# Patient Record
Sex: FEMALE | Race: WHITE | NOT HISPANIC OR LATINO | Employment: STUDENT | URBAN - METROPOLITAN AREA
[De-identification: names, ages, dates, MRNs, and addresses within clinical notes are randomized per-mention and may not be internally consistent; named-entity substitution may affect disease eponyms.]

---
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
## 2020-01-03 NOTE — PROGRESS NOTES
CC:  Vitamin B12 deficiency    HPI:  Ms Enriquez is a 20 yo woman who presents today for vit B12 deficiency. She presented on 12/9/19 with bilateral lower extremity weakness, ascending paresthesias over 7-10 days. LP showed slightly elevated protein otherwise normal. MRI brain unremarkable. she was found to have vitamin B12 deficiency with B12 level of 157. She received vit B12 injection daily x 5 days with improvement in her symptoms. Folate normal 13.6. Hb was normal at 13.6. Intrinsic factor antibody preliminary result is negative. Anti-parietal antibody is negative. She presents today for further management. Numbness has improved but still has numbness from feet to knees. Denies other complaints. She has always been eating a normal diet with vegetables and meat. She lives in [place] but comes here for school. Has gotten 3 weekly vit B12 with her PCP in [place]. Last dose on 1/2.       Past Medical History:   Diagnosis Date    Depression         Past Surgical History:   Procedure Laterality Date    TONSILLECTOMY         No family history on file.    Social History     Socioeconomic History    Marital status: Single     Spouse name: Not on file    Number of children: Not on file    Years of education: Not on file    Highest education level: Not on file   Occupational History    Not on file   Social Needs    Financial resource strain: Not on file    Food insecurity:     Worry: Not on file     Inability: Not on file    Transportation needs:     Medical: Not on file     Non-medical: Not on file   Tobacco Use    Smoking status: Never Smoker    Smokeless tobacco: Never Used   Substance and Sexual Activity    Alcohol use: Yes     Comment: occ    Drug use: Not Currently    Sexual activity: Not on file   Lifestyle    Physical activity:     Days per week: Not on file     Minutes per session: Not on file    Stress: Not on file   Relationships    Social connections:     Talks on phone: Not on file     Gets  together: Not on file     Attends [employer] service: Not on file     Active member of club or organization: Not on file     Attends meetings of clubs or organizations: Not on file     Relationship status: Not on file   Other Topics Concern    Not on file   Social History Narrative    Not on file       Review of Systems   Constitutional: Negative for appetite change, chills, fatigue, fever and unexpected weight change.   HENT: Negative for mouth sores, nosebleeds, tinnitus, trouble swallowing and voice change.    Eyes: Negative for pain, redness and visual disturbance.   Respiratory: Negative for cough, shortness of breath and wheezing.    Cardiovascular: Negative for chest pain, palpitations and leg swelling.   Gastrointestinal: Negative for abdominal distention, abdominal pain, blood in stool, constipation, diarrhea, nausea and vomiting.   Endocrine: Negative for polydipsia, polyphagia and polyuria.   Genitourinary: Negative for flank pain, frequency and hematuria.   Musculoskeletal: Negative for arthralgias, back pain, gait problem, joint swelling, myalgias, neck pain and neck stiffness.   Skin: Negative for color change, pallor, rash and wound.   Neurological: Positive for numbness. Negative for tremors, seizures, syncope, speech difficulty, weakness, light-headedness and headaches.   Hematological: Negative for adenopathy. Does not bruise/bleed easily.   Psychiatric/Behavioral: Negative for confusion, dysphoric mood and self-injury. The patient is not nervous/anxious.    All other systems reviewed and are negative.      Objective:  Physical Exam   Constitutional: She is oriented to person, place, and time. She appears well-developed and well-nourished. No distress.   HENT:   Head: Normocephalic and atraumatic.   Mouth/Throat: Oropharynx is clear and moist. No oropharyngeal exudate.   Eyes: Pupils are equal, round, and reactive to light. Conjunctivae and EOM are normal. No scleral icterus.   Neck: Normal range  of motion. Neck supple. No JVD present. No thyromegaly present.   Cardiovascular: Normal rate, regular rhythm, normal heart sounds and intact distal pulses. Exam reveals no gallop and no friction rub.   No murmur heard.  Pulmonary/Chest: Effort normal and breath sounds normal. No respiratory distress. She has no wheezes. She has no rales.   Abdominal: Soft. Bowel sounds are normal. She exhibits no distension and no mass. There is no hepatosplenomegaly. There is no tenderness. There is no rebound. No hernia.   Musculoskeletal: Normal range of motion. She exhibits no edema, tenderness or deformity.   Lymphadenopathy:     She has no cervical adenopathy.        Right: No supraclavicular adenopathy present.        Left: No supraclavicular adenopathy present.   Neurological: She is alert and oriented to person, place, and time. No cranial nerve deficit.   Skin: Skin is warm and dry. No rash noted. She is not diaphoretic. No erythema. No pallor.   Psychiatric: She has a normal mood and affect. Her behavior is normal. Judgment and thought content normal.   Vitals reviewed.      Labs:  12/20/2019: vitamin B12 level of 157.    Imaging Data:  Folate normal 13.6. Hb was normal at 13.6. Intrinsic factor antibody preliminary result is negative. Anti-parietal antibody is negative.     Assessment and plan:    1. B12 deficiency    2. Paresthesia       1.  - Ms Enriquez is a 18 yo woman with vitamin B12 deficiency.   - eats a normal diet but still with low vit B12 deficiency causing paresthesia. Refer to GI for further workup. Consider EGD  - check B12 level today  - Has gotten 3 weekly vit B12 with her PCP in [place]. Last dose on 1/2.   - give last weekly injection of B12 once approved. Then B12 monthly  - RTC in 2 months for follow up    2.   - improved after B12 injection started  - f/u with neurology

## 2020-01-07 ENCOUNTER — LAB VISIT (OUTPATIENT)
Dept: LAB | Facility: OTHER | Age: 20
End: 2020-01-07
Attending: INTERNAL MEDICINE
Payer: COMMERCIAL

## 2020-01-07 ENCOUNTER — INITIAL CONSULT (OUTPATIENT)
Dept: HEMATOLOGY/ONCOLOGY | Facility: CLINIC | Age: 20
End: 2020-01-07
Payer: COMMERCIAL

## 2020-01-07 VITALS
SYSTOLIC BLOOD PRESSURE: 109 MMHG | DIASTOLIC BLOOD PRESSURE: 62 MMHG | HEIGHT: 64 IN | HEART RATE: 99 BPM | TEMPERATURE: 97 F | OXYGEN SATURATION: 97 % | RESPIRATION RATE: 16 BRPM | BODY MASS INDEX: 28.6 KG/M2 | WEIGHT: 167.56 LBS

## 2020-01-07 DIAGNOSIS — E53.8 B12 DEFICIENCY: Primary | ICD-10-CM

## 2020-01-07 DIAGNOSIS — E53.8 B12 DEFICIENCY: ICD-10-CM

## 2020-01-07 DIAGNOSIS — R20.2 PARESTHESIA: ICD-10-CM

## 2020-01-07 PROCEDURE — 82607 VITAMIN B-12: CPT

## 2020-01-07 PROCEDURE — 3008F BODY MASS INDEX DOCD: CPT | Mod: CPTII,S$GLB,, | Performed by: INTERNAL MEDICINE

## 2020-01-07 PROCEDURE — 36415 COLL VENOUS BLD VENIPUNCTURE: CPT

## 2020-01-07 PROCEDURE — 99999 PR PBB SHADOW E&M-EST. PATIENT-LVL IV: ICD-10-PCS | Mod: PBBFAC,,, | Performed by: INTERNAL MEDICINE

## 2020-01-07 PROCEDURE — 99999 PR PBB SHADOW E&M-EST. PATIENT-LVL IV: CPT | Mod: PBBFAC,,, | Performed by: INTERNAL MEDICINE

## 2020-01-07 PROCEDURE — 99214 PR OFFICE/OUTPT VISIT, EST, LEVL IV, 30-39 MIN: ICD-10-PCS | Mod: S$GLB,,, | Performed by: INTERNAL MEDICINE

## 2020-01-07 PROCEDURE — 3008F PR BODY MASS INDEX (BMI) DOCUMENTED: ICD-10-PCS | Mod: CPTII,S$GLB,, | Performed by: INTERNAL MEDICINE

## 2020-01-07 PROCEDURE — 99214 OFFICE O/P EST MOD 30 MIN: CPT | Mod: S$GLB,,, | Performed by: INTERNAL MEDICINE

## 2020-01-07 RX ORDER — CYANOCOBALAMIN 1000 UG/ML
1000 INJECTION, SOLUTION INTRAMUSCULAR; SUBCUTANEOUS
Status: CANCELLED | OUTPATIENT
Start: 2020-01-07

## 2020-01-07 NOTE — LETTER
January 7, 2020      Viri Gamble PA-C  [address]  [facility] [address]           [facility] [address]  [address], [address]  [facility] [address]  Phone: [phone]          Patient: Denise Enriquez   MR Number: [id number]   YOB: 2000   Date of Visit: 1/7/2020       Dear Viri Gamble:    Thank you for referring Denise Enriquez to me for evaluation. Attached you will find relevant portions of my assessment and plan of care.    If you have questions, please do not hesitate to call me. I look forward to following Denise Enriquez along with you.    Sincerely,    Florentino Hernandez MD    Enclosure  CC:  No Recipients    If you would like to receive this communication electronically, please contact [email or web address] or [phone] to request more information on [employer] Link access.    For providers and/or their staff who would like to refer a patient to [facility], please contact us through our one-stop-shop provider referral line, [facility] , at [phone].    If you feel you have received this communication in error or would no longer like to receive these types of communications, please e-mail [email or web address]

## 2020-01-07 NOTE — Clinical Note
Check vit B12 today. Refer to GI. Verify with PA re vitamin B12. Get vitamin B12 weekly x one dose (only has one left), followed by monthly (start one week after the weekly dose). RTC on 3/19 with CBC, vit B12

## 2020-01-08 LAB — VIT B12 SERPL-MCNC: 755 PG/ML (ref 210–950)

## 2020-01-22 LAB
ANNOTATION COMMENT IMP: NORMAL
IF BLOCK AB SER QL: NEGATIVE

## 2020-01-23 ENCOUNTER — OFFICE VISIT (OUTPATIENT)
Dept: NEUROLOGY | Facility: CLINIC | Age: 20
End: 2020-01-23
Payer: COMMERCIAL

## 2020-01-23 VITALS
DIASTOLIC BLOOD PRESSURE: 73 MMHG | BODY MASS INDEX: 29.67 KG/M2 | WEIGHT: 173.81 LBS | HEIGHT: 64 IN | SYSTOLIC BLOOD PRESSURE: 124 MMHG | HEART RATE: 83 BPM

## 2020-01-23 DIAGNOSIS — R93.7 ABNORMAL MRI, THORACIC SPINE: Primary | ICD-10-CM

## 2020-01-23 DIAGNOSIS — R93.7 ABNORMAL MRI, CERVICAL SPINE: ICD-10-CM

## 2020-01-23 DIAGNOSIS — E55.9 VITAMIN D INSUFFICIENCY: ICD-10-CM

## 2020-01-23 DIAGNOSIS — R20.2 PARESTHESIA: ICD-10-CM

## 2020-01-23 DIAGNOSIS — E53.8 VITAMIN B12 DEFICIENCY: ICD-10-CM

## 2020-01-23 DIAGNOSIS — F19.90 DRUG USE: ICD-10-CM

## 2020-01-23 PROCEDURE — 99215 PR OFFICE/OUTPT VISIT, EST, LEVL V, 40-54 MIN: ICD-10-PCS | Mod: S$GLB,,, | Performed by: PSYCHIATRY & NEUROLOGY

## 2020-01-23 PROCEDURE — 99999 PR PBB SHADOW E&M-EST. PATIENT-LVL III: CPT | Mod: PBBFAC,,, | Performed by: PSYCHIATRY & NEUROLOGY

## 2020-01-23 PROCEDURE — 99999 PR PBB SHADOW E&M-EST. PATIENT-LVL III: ICD-10-PCS | Mod: PBBFAC,,, | Performed by: PSYCHIATRY & NEUROLOGY

## 2020-01-23 PROCEDURE — 99215 OFFICE O/P EST HI 40 MIN: CPT | Mod: S$GLB,,, | Performed by: PSYCHIATRY & NEUROLOGY

## 2020-01-23 PROCEDURE — 3008F BODY MASS INDEX DOCD: CPT | Mod: CPTII,S$GLB,, | Performed by: PSYCHIATRY & NEUROLOGY

## 2020-01-23 PROCEDURE — 3008F PR BODY MASS INDEX (BMI) DOCUMENTED: ICD-10-PCS | Mod: CPTII,S$GLB,, | Performed by: PSYCHIATRY & NEUROLOGY

## 2020-01-23 RX ORDER — DULOXETIN HYDROCHLORIDE 30 MG/1
30 CAPSULE, DELAYED RELEASE ORAL DAILY
Qty: 30 CAPSULE | Refills: 11 | Status: SHIPPED | OUTPATIENT
Start: 2020-01-23 | End: 2020-09-22

## 2020-01-23 RX ORDER — MIRTAZAPINE 7.5 MG/1
7.5 TABLET, FILM COATED ORAL NIGHTLY
COMMUNITY
End: 2020-03-05

## 2020-01-23 NOTE — LETTER
January 30, 2020      Carlota Gonzalez MD  [address]  [address]  [facility] [address]           [facility]  [address]  [facility] [address]  Phone: [phone]          Patient: Denise Enriquez   MR Number: [id number]   YOB: 2000   Date of Visit: 1/23/2020       Dear Dr. Carlota Gonzalez:    Thank you for referring Denise Enriquez to me for evaluation. Attached you will find relevant portions of my assessment and plan of care.    If you have questions, please do not hesitate to call me. I look forward to following Denise Enriquez along with you.    Sincerely,    Negro Mosquera  CC:  No Recipients    If you would like to receive this communication electronically, please contact [email or web address] or [phone] to request more information on [employer] Link access.    For providers and/or their staff who would like to refer a patient to [facility], please contact us through our one-stop-shop provider referral line, [facility], at [phone].    If you feel you have received this communication in error or would no longer like to receive these types of communications, please e-mail [email or web address]

## 2020-01-27 ENCOUNTER — PATIENT MESSAGE (OUTPATIENT)
Dept: NEUROLOGY | Facility: CLINIC | Age: 20
End: 2020-01-27

## 2020-01-27 NOTE — PROGRESS NOTES
Neurology Clinic Visit  Primary Care Provider: Karina Rasmussen MD   Referring Provider: Carlota Gonzalez MD   Date of Visit: 01/23/2020  Reason for referral: Transverse Myelitis     chief complaint:   Chief Complaint   Patient presents with    Neurologic Problem       History of Present Illness  Denise Enriquez is a 19 y.o. female who presents for evaluation for transverse myelitis.  The patient reports on December 2, 2019, she woke up with tingling and numbness in both of her legs.  There was no pain associated with this.  She did not experience any difficulty walking, bowel incontinence, or urinary incontinence.  The symptoms persisted and on December 9th, she noticed that the sensation went up to her belly button and she also felt that her left hand primarily the palm and finger tips were numb.  She presented to [facility] and was admitted for further evaluation.  She was examined by the Neurology service who found a T9 sensory level to pinprick and temperature and MRI of the cervical, thoracic, and lumbar spine revealed multiple lesions within the thoracic spine.  She was treated for transverse myelitis with a 5 day course of IV Solu-Medrol.  She never developed weakness. The patient reports that her tingling and numbness of her lower extremities has improved except for some residual tingling in her feet.  She has noticed whenever she bends her head towards her chest, she gets a shock-wave sensation down her back. She also describes this as her nerves vibrating.   The paresthesias in the left hand have resolved.  She denies any previous attacks as such.  She denies episodes of vision loss or eye pain in the past. She denies having any recent vaccinations prior to her admission to the hospital.  She was treated for bacterial vaginosis about 1 week prior to admission.  She denies any history of autoimmune disorders.  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 While in hospital, workup revealed vitamin B12 deficiency for which she is on replacement. She has a history of depression for which she was taking Mirtazapine. She is planning to stop Mirtazapine which she states was ok from her psychiatrist.  She is currently on B12 and vitamin D supplementation and feels that depression has improved since starting vitamin supplementation.     Serum Workup:  B12 158  Mag Antibody Negative 12/11/2019  MMA >5.00 elevated, 12/11/2019  Lyme Negative, 12/10/2019  SS-A/SS-B negative  Serum NMO negative  RPR nonreactive  KRYSTIAN negative  VDRL Negative  Intrinsic Factor Blocking Antibody negative, 12/11/2019  Antiparietal antibody negative, 12/11/2019  Ceruloplasmin normal  Vitamin D: 22  HTLV I/II Antibody negative  HIV negative    CSF Analysis:   CSF: protein 49, glucose 55 Oligoclonal bands 0, IgG index normal; WBC 5; RBC 3, 12/9/2020   EBV DNA Qual by PCR CSF not detected, 12/10/2019  VDRL Nonreactive, [place] IgG/IgM negative, 12/9-10/2020     Imaging: (personally reviewed)    MRI Cervical, Thoracic, Lumbar Spine w/wo contrast: 12/10/2020  Left paracentral disc protrusion at the C5-C6 level with mild narrowing of the spinal canal.  Grade 1 anterolisthesis of L5 on S1 suspected bilateral pars defects at this level.  Dedicated noncontrast CT scan of the lumbar spine may be obtained for further evaluation.  Spine surgery evaluation is suggested. Uncovering of the disc at the L5-S1 level without significant narrowing of the spinal canal.  Large central disc extrusion at the L4-L5 level with associated mild narrowing of the spinal canal.  Radiologist Addendum:There are multiple scattered intramedullary lesions throughout the thoracic cord.  There is no evidence of cord expansion.    MRI Cervical Spine w/wo: 12/11/2019  There is indentation of the ventral thecal sac at the C5-C6 level secondary to left paracentral disc protrusion.  The findings are unchanged compared to the prior  examination.  No cord signal abnormality is identified per radiology read.  On my review, there is questionable  increase T2 signal of cord at C2 level on right.    MRI Brain w/wo contrast: 12/10/2019  Unremarkable contrast enhanced MRI of the brain    Outside reports reviewed: historical medical records.    Patient Active Problem List    Diagnosis Date Noted    Paresthesia     Myelopathy, subacute with ascending paresthesias 12/10/2019    B12 deficiency 12/10/2019     Past Medical History:   Diagnosis Date    Depression      Past Surgical History:   Procedure Laterality Date    TONSILLECTOMY       Family history:  She denies family history of Multiple Sclerosis or autoimmune disorders.       Current Outpatient Medications   Medication Sig    mirtazapine (REMERON) 7.5 MG Tab Take 7.5 mg by mouth every evening.    cyanocobalamin 1,000 mcg/mL injection Inject 1 mL (1,000 mcg total) into the muscle once daily. (Patient taking differently: Inject 1,000 mcg into the muscle every 30 days. )    DULoxetine (CYMBALTA) 30 MG capsule Take 1 capsule (30 mg total) by mouth once daily.    ergocalciferol (ERGOCALCIFEROL) 50,000 unit Cap Take 1 capsule (50,000 Units total) by mouth every 7 days.     No current facility-administered medications for this visit.        Review of patient's allergies indicates:  No Known Allergies  Social History     Socioeconomic History    Marital status: Single     Spouse name: Not on file    Number of children: Not on file    Years of education: Not on file    Highest education level: Not on file   Occupational History    Not on file   Social Needs    Financial resource strain: Not on file    Food insecurity:     Worry: Not on file     Inability: Not on file    Transportation needs:     Medical: Not on file     Non-medical: Not on file   Tobacco Use    Smoking status: Never Smoker    Smokeless tobacco: Never Used   Substance and Sexual Activity    Alcohol use: Yes     Comment:  "occ    Drug use: Not Currently    Sexual activity: Not on file   Lifestyle    Physical activity:     Days per week: Not on file     Minutes per session: Not on file    Stress: Not on file   Relationships    Social connections:     Talks on phone: Not on file     Gets together: Not on file     Attends Bahai service: Not on file     Active member of club or organization: Not on file     Attends meetings of clubs or organizations: Not on file     Relationship status: Not on file   Other Topics Concern    Not on file   Social History Narrative    Not on file       Review of Systems    Constitutional: + weight gain  Eyes: negative  Ears, nose, mouth, throat, and face: negative  Respiratory: negative  Cardiovascular: negative  Gastrointestinal: negative  Genitourinary:negative  Integument/breast: negative  Hematologic/lymphatic: negative  Musculoskeletal:negative  Neurological: positive for paresthesia in legs  Behavioral/Psych: negative  Endocrine: negative  Allergic/Immunologic: negative    Objective:  Vital signs in last 24 hours:    Vitals:    01/23/20 1441   BP: 124/73   Pulse: 83   Weight: 78.8 kg (173 lb 13.3 oz)   Height: 5' 4" (1.626 m)       Body mass index is 29.84 kg/m².     25 Ft walk test: 3.53seconds without assitance.    In general, the patient is well nourished.    MENTAL STATUS: language is fluent, normal verbal comprehension, short-term and remote memory is intact, attention is normal, patient is alert and oriented x 3, fund of knowlege is appropriate by vocabulary.     CRANIAL NERVE EXAM:  Visual acuity 20/20 OD and OS. There is no AMARILIS.  Extraocular muscles are intact. Pupils are equal, round, and reactive to light. No facial asymmetry. Facial sensation is intact bilaterally. There is no dysarthria. Uvula is midline, and palate moves symmetrically. Shoulder shrug intact bilaterlly. Tongue protrusion is midline. Hearing is grossly intact. Neck is supple.     MOTOR EXAM: Normal bulk and tone " throughout UE and LE bilaterally.   No pronator drift; rapid sequential movements are normal; Strength is  5/5 in all groups in the lower extremities and upper extremities;    REFLEXES: 3+ and symmetric throughout in all four extremeties; toes are down bilaterally    SENSORY EXAM: Normal to light touch, pinprick throughout. Decrease vibration sense upto knee in Right Lower extremity.    COORDINATION: Normal finger-to-nose exam     GAIT: Narrow based and stable    Imaging  As above    Assessment/Plan:    This is a 19-year-old female own who presented to an outside hospital for paresthesias in her lower extremities which later extended to abdomen and left hand.  She was admitted and treated for transverse myelitis for which she has been referred here for further evaluation.  Upon workup at outside hospital, MRI of the thoracic spine revealed multiple levels of increase T2 signal concerning for transverse myelitis.  There was questionable increased signal noted on MRI of the cervical spine although not officially documented per Radiology.  She was treated with IV Solu-Medrol for 5 days and her symptoms have mostly resolved except for residual paresthesias in her feet.  CSF analysis revealed no oligoclonal bands, normal IgG index, normal WBC count.  She was also found to be B12 deficient for which she is on replacement therapy.  She does report history of drug use including Whippets which she stopped since discharge.  Her transverse myelitis could be secondary to drug use (Nitric oxide/whippet induced) vs idiopathic vs demyelinating disease such as MS. Given new symptoms (Lhermitte's sign) that has developed after discharge, we will repeat imaging for further evaluation.     1. Abnormal MRI Thoracic Spine  2. Abnormal MRI Cervical Spine  3. Paresthesias related to #1 and #2  4. B12 deficiency  5. Vitamin D Insufficiency    Plan:  Obtain MRI Brain, MRI Cervical Spine, MRI Thoracic Spine.  Patient was educated on drug  use  Stop Mirtazapine and start Cymbalta  Continue Vitamin B12 and Vitamin D supplementation    Follow-up in 6 weeks.      Problem List Items Addressed This Visit        Other    Paresthesia - Primary    Relevant Medications    DULoxetine (CYMBALTA) 30 MG capsule    Other Relevant Orders    MRI Brain Demyelinating W W/O Contrast    MRI Cervical Spine Demyelinating W W/O Contrast    MRI Thoracic Spine Demyelinating W W/O Contrast      Other Visit Diagnoses     Abnormal MRI, thoracic spine        Relevant Orders    MRI Brain Demyelinating W W/O Contrast    MRI Cervical Spine Demyelinating W W/O Contrast    MRI Thoracic Spine Demyelinating W W/O Contrast    Abnormal MRI, cervical spine        Relevant Orders    MRI Brain Demyelinating W W/O Contrast    MRI Cervical Spine Demyelinating W W/O Contrast    MRI Thoracic Spine Demyelinating W W/O Contrast    Vitamin B12 deficiency        Vitamin D insufficiency              Orlando Meyer MD  Neuroimmunology/MS Fellow  [facility]

## 2020-01-28 ENCOUNTER — PATIENT MESSAGE (OUTPATIENT)
Dept: INFUSION THERAPY | Facility: OTHER | Age: 20
End: 2020-01-28

## 2020-01-28 ENCOUNTER — PATIENT MESSAGE (OUTPATIENT)
Dept: NEUROLOGY | Facility: CLINIC | Age: 20
End: 2020-01-28

## 2020-01-29 ENCOUNTER — TELEPHONE (OUTPATIENT)
Dept: NEUROLOGY | Facility: CLINIC | Age: 20
End: 2020-01-29

## 2020-01-29 LAB
ALBUMIN CSF-MCNC: 29.1 MG/DL
ALBUMIN SERPL-MCNC: 4400 MG/DL (ref 3200–4800)
EV RNA SPEC QL NAA+PROBE: NEGATIVE
HSV1, PCR, CSF: NEGATIVE
HSV2, PCR, CSF: NEGATIVE
IGG CSF-MCNC: 3 MG/DL
IGG SERPL-MCNC: 844 MG/DL (ref 767–1590)
IGG SYNTH RATE SER+CSF CALC-MRATE: 1.36 MG/24 H
IGG/ALB CLEAR SER+CSF-RTO: 0.53
IGG/ALB CSF: 0.1 {RATIO}
IGG/ALB SER: 0.19 {RATIO}
OLIGOCLONAL BANDS CSF ELPH-IMP: 0 BANDS
OLIGOCLONAL BANDS CSF ELPH-IMP: 0 BANDS
OLIGOCLONAL BANDS SERPL: 0 BANDS
SPECIMEN SOURCE: NORMAL
SPECIMEN SOURCE: NORMAL
VARICELLA ZOSTER BY PCR RESULT: NEGATIVE
WEST NILE VIRUS CSF INTERP: NORMAL
WNV IGG CSF QL: NEGATIVE
WNV IGM CSF QL IA: NEGATIVE

## 2020-01-30 ENCOUNTER — HOSPITAL ENCOUNTER (OUTPATIENT)
Dept: RADIOLOGY | Facility: HOSPITAL | Age: 20
Discharge: HOME | End: 2020-01-30
Attending: PSYCHIATRY & NEUROLOGY
Payer: COMMERCIAL

## 2020-01-30 DIAGNOSIS — R93.7 ABNORMAL MRI, THORACIC SPINE: ICD-10-CM

## 2020-01-30 DIAGNOSIS — R93.7 ABNORMAL MRI, CERVICAL SPINE: ICD-10-CM

## 2020-01-30 DIAGNOSIS — R20.2 PARESTHESIA: ICD-10-CM

## 2020-01-30 PROCEDURE — 72156 MRI NECK SPINE W/O & W/DYE: CPT | Mod: 26,,, | Performed by: RADIOLOGY

## 2020-01-30 PROCEDURE — A9585 GADOBUTROL INJECTION: HCPCS | Performed by: PSYCHIATRY & NEUROLOGY

## 2020-01-30 PROCEDURE — 72157 MRI CHEST SPINE W/O & W/DYE: CPT | Mod: 26,,, | Performed by: RADIOLOGY

## 2020-01-30 PROCEDURE — 72157 MRI THORACIC SPINE DEMYELINATING W W/O CONTRAST: ICD-10-PCS | Mod: 26,,, | Performed by: RADIOLOGY

## 2020-01-30 PROCEDURE — 25500020 PHARM REV CODE 255: Performed by: PSYCHIATRY & NEUROLOGY

## 2020-01-30 PROCEDURE — 70553 MRI BRAIN STEM W/O & W/DYE: CPT | Mod: 26,,, | Performed by: RADIOLOGY

## 2020-01-30 PROCEDURE — 72156 MRI NECK SPINE W/O & W/DYE: CPT | Mod: TC

## 2020-01-30 PROCEDURE — 70553 MRI BRAIN STEM W/O & W/DYE: CPT | Mod: TC

## 2020-01-30 PROCEDURE — 70553 MRI BRAIN DEMYELINATING W/ WO CONTRAST: ICD-10-PCS | Mod: 26,,, | Performed by: RADIOLOGY

## 2020-01-30 PROCEDURE — 72157 MRI CHEST SPINE W/O & W/DYE: CPT | Mod: TC

## 2020-01-30 PROCEDURE — 72156 MRI CERVICAL SPINE DEMYELINATING W W/O CONTRAST: ICD-10-PCS | Mod: 26,,, | Performed by: RADIOLOGY

## 2020-01-30 RX ORDER — GADOBUTROL 604.72 MG/ML
9 INJECTION INTRAVENOUS
Status: COMPLETED | OUTPATIENT
Start: 2020-01-30 | End: 2020-01-30

## 2020-01-30 RX ORDER — ALPRAZOLAM 1 MG/1
1 TABLET ORAL ONCE
Qty: 1 TABLET | Refills: 0 | Status: SHIPPED | OUTPATIENT
Start: 2020-01-30 | End: 2020-03-05

## 2020-01-30 RX ADMIN — GADOBUTROL 9 ML: 604.72 INJECTION INTRAVENOUS at 04:01

## 2020-02-06 ENCOUNTER — PATIENT MESSAGE (OUTPATIENT)
Dept: INFUSION THERAPY | Facility: OTHER | Age: 20
End: 2020-02-06

## 2020-03-05 ENCOUNTER — OFFICE VISIT (OUTPATIENT)
Dept: NEUROLOGY | Facility: CLINIC | Age: 20
End: 2020-03-05
Payer: COMMERCIAL

## 2020-03-05 VITALS
BODY MASS INDEX: 29.35 KG/M2 | DIASTOLIC BLOOD PRESSURE: 58 MMHG | SYSTOLIC BLOOD PRESSURE: 102 MMHG | HEART RATE: 66 BPM | HEIGHT: 64 IN | WEIGHT: 171.94 LBS

## 2020-03-05 DIAGNOSIS — E53.8 LOW SERUM VITAMIN B12: ICD-10-CM

## 2020-03-05 DIAGNOSIS — G37.3 TRANSVERSE MYELOPATHY: Primary | ICD-10-CM

## 2020-03-05 PROCEDURE — 3008F PR BODY MASS INDEX (BMI) DOCUMENTED: ICD-10-PCS | Mod: CPTII,S$GLB,, | Performed by: PSYCHIATRY & NEUROLOGY

## 2020-03-05 PROCEDURE — 3008F BODY MASS INDEX DOCD: CPT | Mod: CPTII,S$GLB,, | Performed by: PSYCHIATRY & NEUROLOGY

## 2020-03-05 PROCEDURE — 99215 OFFICE O/P EST HI 40 MIN: CPT | Mod: S$GLB,,, | Performed by: PSYCHIATRY & NEUROLOGY

## 2020-03-05 PROCEDURE — 99999 PR PBB SHADOW E&M-EST. PATIENT-LVL III: ICD-10-PCS | Mod: PBBFAC,,, | Performed by: PSYCHIATRY & NEUROLOGY

## 2020-03-05 PROCEDURE — 99999 PR PBB SHADOW E&M-EST. PATIENT-LVL III: CPT | Mod: PBBFAC,,, | Performed by: PSYCHIATRY & NEUROLOGY

## 2020-03-05 PROCEDURE — 99215 PR OFFICE/OUTPT VISIT, EST, LEVL V, 40-54 MIN: ICD-10-PCS | Mod: S$GLB,,, | Performed by: PSYCHIATRY & NEUROLOGY

## 2020-03-05 NOTE — LETTER
April 1, 2020      Viri Gamble PA-C  [address]  [facility] [address]           [facility]  [address]  [facility] [address]  Phone: [phone]          Patient: Denise Enriquez   MR Number: [id number]   YOB: 2000   Date of Visit: 3/5/2020       Dear Viri Gamble:    Thank you for referring Denise Enriquez to me for evaluation. Attached you will find relevant portions of my assessment and plan of care.    If you have questions, please do not hesitate to call me. I look forward to following Denise Enriquez along with you.    Sincerely,    Orlando Meyer MD    Enclosure  CC:  No Recipients    If you would like to receive this communication electronically, please contact [email or web address] or [phone] to request more information on [employer] Link access.    For providers and/or their staff who would like to refer a patient to [facility], please contact us through our one-stop-shop provider referral line, [facility] , at [phone].    If you feel you have received this communication in error or would no longer like to receive these types of communications, please e-mail [email or web address]

## 2020-03-05 NOTE — PROGRESS NOTES
Subjective:       Patient ID: Denise Enriquez is a 19 y.o. female who presents today for a routine clinic visit for follow-up on transverse myelitis.      History of Present Illness  Denise Enriquez is a 19 y.o. female who presents for evaluation for transverse myelitis.  The patient reports on December 2, 2019, she woke up with tingling and numbness in both of her legs.  There was no pain associated with this.  She did not experience any difficulty walking, bowel incontinence, or urinary incontinence.  The symptoms persisted and on December 9th, she noticed that the sensation went up to her belly button and she also felt that her left hand primarily the palm and finger tips were numb.  She presented to [facility] and was admitted for further evaluation.  She was examined by the Neurology service who found a T9 sensory level to pinprick and temperature and MRI of the cervical, thoracic, and lumbar spine revealed multiple lesions within the thoracic spine.  She was treated for transverse myelitis with a 5 day course of IV Solu-Medrol.  She never developed weakness. The patient reports that her tingling and numbness of her lower extremities has improved except for some residual tingling in her feet.  She has noticed whenever she bends her head towards her chest, she gets a shock-wave sensation down her back. She also describes this as her nerves vibrating.   The paresthesias in the left hand have resolved.  She denies any previous attacks as such.  She denies episodes of vision loss or eye pain in the past. She denies having any recent vaccinations prior to her admission to the hospital.  She was treated for bacterial vaginosis about 1 week prior to admission.  She denies any history of autoimmune disorders.  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 While in hospital, workup revealed vitamin B12 deficiency for which she is on replacement. She has a  "history of depression for which she was taking Mirtazapine. She is planning to stop Mirtazapine which she states was ok from her psychiatrist.  She is currently on B12 and vitamin D supplementation and feels that depression has improved since starting vitamin supplementation.       Interval history:  no new symptoms since last visit. She states she is doing well. Numbness and tingling has improved in her legs but still some residual just in her feet. She is still taking Vitamin D and Vitamin B12.  Two weeks ago, she had sore throat and not feeling well and noticed she had some difficulty opening pop-tamika. Lerhmitte's sign has resolved.  MRI Brain w/wo ordered after last visit was normal and without demyelinating lesions. MRI cervical and thoracic cord repeated after last visit and were normal.         SOCIAL HISTORY  Social History     Tobacco Use    Smoking status: Never Smoker    Smokeless tobacco: Never Used   Substance Use Topics    Alcohol use: Yes     Comment: occ    Drug use: Not Currently     Constitutional: negative  Eyes: negative  Ears, nose, mouth, throat, and face: negative  Respiratory: negative  Cardiovascular: negative  Gastrointestinal: negative  Genitourinary:negative  Integument/breast: negative  Hematologic/lymphatic: negative  Musculoskeletal:negative  Neurological: negative  Behavioral/Psych: negative  Endocrine: negative  Allergic/Immunologic: negative      Objective:      Vitals:    03/05/20 1559   BP: (!) 102/58   Pulse: 66   Weight: 78 kg (171 lb 15.3 oz)   Height: 5' 4" (1.626 m)     Neurologic Exam    In general, the patient is well nourished.     MENTAL STATUS: language is fluent, normal verbal comprehension, short-term and remote memory is intact, attention is normal, patient is alert and oriented x 3, fund of knowlege is appropriate by vocabulary.      CRANIAL NERVE EXAM:  Visual acuity 20/20 OD and OS. There is no AMARILIS.  Extraocular muscles are intact. Pupils are equal, round, " and reactive to light. No facial asymmetry. Facial sensation is intact bilaterally. There is no dysarthria. Uvula is midline, and palate moves symmetrically. Shoulder shrug intact bilaterlly. Tongue protrusion is midline. Hearing is grossly intact. Neck is supple.      MOTOR EXAM: Normal bulk and tone throughout UE and LE bilaterally.   No pronator drift; rapid sequential movements are normal; Strength is  5/5 in all groups in the lower extremities and upper extremities;     REFLEXES: 3+ and symmetric throughout in all four extremeties; toes are down bilaterally     SENSORY EXAM: Normal to light touch     COORDINATION: Normal finger-to-nose exam      GAIT: Narrow based and stable     Imaging:     Results for orders placed during the hospital encounter of 01/30/20   MRI Brain Demyelinating W W/O Contrast    Impression No significant change from prior.  Essentially unremarkable MRI brain as detailed above specifically without evidence for parenchymal signal abnormality or enhancing lesion.      Electronically signed by: James Garcia DO  Date:    01/30/2020  Time:    16:27     Results for orders placed during the hospital encounter of 01/30/20   MRI Cervical Spine Demyelinating W W/O Contrast    Impression Unremarkable noncontrast MRI of the cervical and thoracic spine as detailed above specifically without evidence for cord signal abnormality to suggest edema.  No abnormal intrathecal enhancement.    Previous questioned T2 stir signal hyperintensity in the thoracic cord is not seen on the current survey..      Electronically signed by: James Garcia DO  Date:    01/30/2020  Time:    16:31     Results for orders placed during the hospital encounter of 01/30/20   MRI Thoracic Spine Demyelinating W W/O Contrast    Impression Unremarkable noncontrast MRI of the cervical and thoracic spine as detailed above specifically without evidence for cord signal abnormality to suggest edema.  No abnormal intrathecal  enhancement.    Previous questioned T2 stir signal hyperintensity in the thoracic cord is not seen on the current survey..      Electronically signed by: James Garcia DO  Date:    01/30/2020  Time:    16:31       Labs:     Lab Results   Component Value Date    [id number] 22 (L) 12/13/2019     No results found for: JCVINDEX, JCVANTIBODY  No results found for: KS9MOOVX, ABSOLUTECD3, MX2TFZEQ, ABSOLUTECD8, EX8TCUUM, ABSOLUTECD4, LABCD48  Lab Results   Component Value Date    WBC 10.24 12/14/2019    RBC 4.12 12/14/2019    HGB 12.6 12/14/2019    HCT 37.8 12/14/2019    MCV 92 12/14/2019    MCH 30.6 12/14/2019    MCHC 33.3 12/14/2019    RDW 12.7 12/14/2019     12/14/2019    MPV 10.6 12/14/2019    GRAN 69.0 12/14/2019    LYMPH CANCELED 12/14/2019    LYMPH 24.0 12/14/2019    MONO CANCELED 12/14/2019    MONO 5.0 12/14/2019    EOS CANCELED 12/14/2019    BASO CANCELED 12/14/2019    EOSINOPHIL 0.0 12/14/2019    BASOPHIL 0.0 12/14/2019     Sodium   Date Value Ref Range Status   12/14/2019 143 136 - 145 mmol/L Final     Potassium   Date Value Ref Range Status   12/14/2019 4.1 3.5 - 5.1 mmol/L Final     Chloride   Date Value Ref Range Status   12/14/2019 108 95 - 110 mmol/L Final     CO2   Date Value Ref Range Status   12/14/2019 24 23 - 29 mmol/L Final     Glucose   Date Value Ref Range Status   12/14/2019 141 (H) 70 - 110 mg/dL Final     BUN, Bld   Date Value Ref Range Status   12/14/2019 15 6 - 20 mg/dL Final     Creatinine   Date Value Ref Range Status   12/14/2019 0.7 0.5 - 1.4 mg/dL Final     Calcium   Date Value Ref Range Status   12/14/2019 9.8 8.7 - 10.5 mg/dL Final     Total Protein   Date Value Ref Range Status   12/14/2019 6.9 6.0 - 8.4 g/dL Final     Albumin   Date Value Ref Range Status   12/14/2019 4.1 3.5 - 5.2 g/dL Final     Total Bilirubin   Date Value Ref Range Status   12/14/2019 0.4 0.1 - 1.0 mg/dL Final     Comment:     For infants and newborns, interpretation of results should be based  on  gestational age, weight and in agreement with clinical  observations.  Premature Infant recommended reference ranges:  Up to 24 hours.............<8.0 mg/dL  Up to 48 hours............<12.0 mg/dL  3-5 days..................<15.0 mg/dL  6-29 days.................<15.0 mg/dL       Alkaline Phosphatase   Date Value Ref Range Status   12/14/2019 62 55 - 135 U/L Final     AST   Date Value Ref Range Status   12/14/2019 11 10 - 40 U/L Final     ALT   Date Value Ref Range Status   12/14/2019 25 10 - 44 U/L Final     Anion Gap   Date Value Ref Range Status   12/14/2019 11 8 - 16 mmol/L Final     eGFR if    Date Value Ref Range Status   12/14/2019 >60 >60 mL/min/1.73 m^2 Final     eGFR if non    Date Value Ref Range Status   12/14/2019 >60 >60 mL/min/1.73 m^2 Final     Comment:     Calculation used to obtain the estimated glomerular filtration  rate (eGFR) is the CKD-EPI equation.                    Diagnosis/Assessment/Plan:     This is a 19-year-old female own who presented to an outside hospital for paresthesias in her lower extremities which later extended to abdomen and left hand.  She was admitted and treated for transverse myelitis for which she has been referred here for further evaluation.  Upon workup at outside hospital, MRI of the thoracic spine revealed multiple levels of increase T2 signal concerning for transverse myelitis.  There was questionable increased signal noted on MRI of the cervical spine although not officially documented per Radiology.  She was treated with IV Solu-Medrol for 5 days and her symptoms have mostly resolved except for residual paresthesias in her feet.  CSF analysis revealed no oligoclonal bands, normal IgG index, normal WBC count.  She was also found to be B12 deficient for which she is on replacement therapy.  She does report history of drug use including Whippets which she stopped since discharge.  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 She was educated to avoid recreational drugs.      1. Transverse Myelitis  2. B12 deficiency  3. Vitamin D Insufficiency     Plan:  She was started on Cymbalta last visit for paresthesia which has improved. But we will continue it as she feels that it helps her mood.   Continue Vitamin B12 and Vitamin D supplementation  She was advised to notify us if she develops new or worsening symptoms.      Follow-up in 6 months.       Problem List Items Addressed This Visit     None      Visit Diagnoses     Transverse myelopathy    -  Primary    Low serum vitamin B12              Our visit today lasted 60 minutes, and 100% of this time was spent face to face with the patient. Over 50% of this visit included discussion of the treatment plan/medication changes/symptom management/exam findings/imaging results/coordination of care. The patient agrees with the plan of care.     Orlando Meyer MD  Neuroimmunology/MS Fellow  [facility]

## 2020-09-22 ENCOUNTER — PATIENT MESSAGE (OUTPATIENT)
Dept: NEUROLOGY | Facility: CLINIC | Age: 20
End: 2020-09-22

## 2020-09-22 ENCOUNTER — OFFICE VISIT (OUTPATIENT)
Dept: NEUROLOGY | Facility: CLINIC | Age: 20
End: 2020-09-22
Payer: COMMERCIAL

## 2020-09-22 ENCOUNTER — LAB VISIT (OUTPATIENT)
Dept: LAB | Facility: HOSPITAL | Age: 20
End: 2020-09-22
Attending: PSYCHIATRY & NEUROLOGY
Payer: COMMERCIAL

## 2020-09-22 VITALS
DIASTOLIC BLOOD PRESSURE: 67 MMHG | BODY MASS INDEX: 24.28 KG/M2 | SYSTOLIC BLOOD PRESSURE: 114 MMHG | WEIGHT: 142.19 LBS | HEIGHT: 64 IN | TEMPERATURE: 98 F | HEART RATE: 84 BPM

## 2020-09-22 DIAGNOSIS — Z76.89 ENCOUNTER TO ESTABLISH CARE: ICD-10-CM

## 2020-09-22 DIAGNOSIS — F32.A DEPRESSION, UNSPECIFIED DEPRESSION TYPE: ICD-10-CM

## 2020-09-22 DIAGNOSIS — E53.8 LOW SERUM VITAMIN B12: Primary | ICD-10-CM

## 2020-09-22 DIAGNOSIS — E53.8 LOW SERUM VITAMIN B12: ICD-10-CM

## 2020-09-22 LAB — VIT B12 SERPL-MCNC: 215 PG/ML (ref 210–950)

## 2020-09-22 PROCEDURE — 82607 VITAMIN B-12: CPT

## 2020-09-22 PROCEDURE — 3008F PR BODY MASS INDEX (BMI) DOCUMENTED: ICD-10-PCS | Mod: CPTII,S$GLB,, | Performed by: PSYCHIATRY & NEUROLOGY

## 2020-09-22 PROCEDURE — 99999 PR PBB SHADOW E&M-EST. PATIENT-LVL III: ICD-10-PCS | Mod: PBBFAC,,, | Performed by: PSYCHIATRY & NEUROLOGY

## 2020-09-22 PROCEDURE — 99999 PR PBB SHADOW E&M-EST. PATIENT-LVL III: CPT | Mod: PBBFAC,,, | Performed by: PSYCHIATRY & NEUROLOGY

## 2020-09-22 PROCEDURE — 99215 PR OFFICE/OUTPT VISIT, EST, LEVL V, 40-54 MIN: ICD-10-PCS | Mod: S$GLB,,, | Performed by: PSYCHIATRY & NEUROLOGY

## 2020-09-22 PROCEDURE — 36415 COLL VENOUS BLD VENIPUNCTURE: CPT

## 2020-09-22 PROCEDURE — 99215 OFFICE O/P EST HI 40 MIN: CPT | Mod: S$GLB,,, | Performed by: PSYCHIATRY & NEUROLOGY

## 2020-09-22 PROCEDURE — 3008F BODY MASS INDEX DOCD: CPT | Mod: CPTII,S$GLB,, | Performed by: PSYCHIATRY & NEUROLOGY

## 2020-09-22 RX ORDER — DULOXETIN HYDROCHLORIDE 60 MG/1
60 CAPSULE, DELAYED RELEASE ORAL DAILY
Status: ON HOLD | COMMUNITY
Start: 2020-09-14 | End: 2021-04-12 | Stop reason: HOSPADM

## 2020-09-22 NOTE — PROGRESS NOTES
Subjective:       Patient ID: Denise Enriquez is a 20 y.o. female who presents today for a routine clinic visit for follow-up on transverse myelitis.      History of Present Illness  Denise Enriquez is a 19 y.o. female who presents for evaluation for transverse myelitis.  The patient reports on December 2, 2019, she woke up with tingling and numbness in both of her legs.  There was no pain associated with this.  She did not experience any difficulty walking, bowel incontinence, or urinary incontinence.  The symptoms persisted and on December 9th, she noticed that the sensation went up to her belly button and she also felt that her left hand primarily the palm and finger tips were numb.  She presented to [facility] and was admitted for further evaluation.  She was examined by the Neurology service who found a T9 sensory level to pinprick and temperature and MRI of the cervical, thoracic, and lumbar spine revealed multiple lesions within the thoracic spine.  She was treated for transverse myelitis with a 5 day course of IV Solu-Medrol.  She never developed weakness. The patient reports that her tingling and numbness of her lower extremities has improved except for some residual tingling in her feet.  She has noticed whenever she bends her head towards her chest, she gets a shock-wave sensation down her back. She also describes this as her nerves vibrating.   The paresthesias in the left hand have resolved.  She denies any previous attacks as such.  She denies episodes of vision loss or eye pain in the past. She denies having any recent vaccinations prior to her admission to the hospital.  She was treated for bacterial vaginosis about 1 week prior to admission.  She denies any history of autoimmune disorders.  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 While in hospital, workup revealed vitamin B12 deficiency for which she is on replacement. She has a  "history of depression for which she was taking Mirtazapine. She is planning to stop Mirtazapine which she states was ok from her psychiatrist.  She is currently on B12 and vitamin D supplementation and feels that depression has improved since starting vitamin supplementation.       Interval history:  no new symptoms since last visit. She states she is doing well. Numbness and tingling has resolved. She feels back at her baseline prior to event.       SOCIAL HISTORY  Social History     Tobacco Use    Smoking status: Never Smoker    Smokeless tobacco: Never Used   Substance Use Topics    Alcohol use: Yes     Comment: occ    Drug use: Not Currently     Constitutional: negative  Eyes: negative  Ears, nose, mouth, throat, and face: negative  Respiratory: negative  Cardiovascular: negative  Gastrointestinal: negative  Genitourinary:negative  Integument/breast: negative  Hematologic/lymphatic: negative  Musculoskeletal:negative  Neurological: negative  Behavioral/Psych: negative  Endocrine: negative  Allergic/Immunologic: negative      Objective:      Vitals:    09/22/20 1120   BP: 114/67   Pulse: 84   Temp: 97.7 °F (36.5 °C)   Weight: 64.5 kg (142 lb 3.2 oz)   Height: 5' 4" (1.626 m)     Neurologic Exam    In general, the patient is well nourished.     MENTAL STATUS: language is fluent, normal verbal comprehension, short-term and remote memory is intact, attention is normal, patient is alert and oriented x 3, fund of knowlege is appropriate by vocabulary.      CRANIAL NERVE EXAM:   There is no AMARILIS.  Extraocular muscles are intact. Pupils are equal, round, and reactive to light. No facial asymmetry. Facial sensation is intact bilaterally. There is no dysarthria. Uvula is midline, and palate moves symmetrically. Shoulder shrug intact bilaterlly. Tongue protrusion is midline. Hearing is grossly intact. Neck is supple.      MOTOR EXAM: Normal bulk and tone throughout UE and LE bilaterally.   No pronator drift; rapid " sequential movements are normal; Strength is  5/5 in all groups in the lower extremities and upper extremities;     REFLEXES: 3+ and symmetric throughout in all four extremeties; toes are down bilaterally     SENSORY EXAM: Normal to light touch     COORDINATION: Normal finger-to-nose exam      GAIT: Narrow based and stable, 3.7 seconds without assist     Imaging:     Results for orders placed during the hospital encounter of 01/30/20   MRI Brain Demyelinating W W/O Contrast    Impression No significant change from prior.  Essentially unremarkable MRI brain as detailed above specifically without evidence for parenchymal signal abnormality or enhancing lesion.      Electronically signed by: James Garcia DO  Date:    01/30/2020  Time:    16:27     Results for orders placed during the hospital encounter of 01/30/20   MRI Cervical Spine Demyelinating W W/O Contrast    Impression Unremarkable noncontrast MRI of the cervical and thoracic spine as detailed above specifically without evidence for cord signal abnormality to suggest edema.  No abnormal intrathecal enhancement.    Previous questioned T2 stir signal hyperintensity in the thoracic cord is not seen on the current survey..      Electronically signed by: James Garcia DO  Date:    01/30/2020  Time:    16:31     Results for orders placed during the hospital encounter of 01/30/20   MRI Thoracic Spine Demyelinating W W/O Contrast    Impression Unremarkable noncontrast MRI of the cervical and thoracic spine as detailed above specifically without evidence for cord signal abnormality to suggest edema.  No abnormal intrathecal enhancement.    Previous questioned T2 stir signal hyperintensity in the thoracic cord is not seen on the current survey..      Electronically signed by: James Garcia DO  Date:    01/30/2020  Time:    16:31       Labs:     Lab Results   Component Value Date    [id number] 22 (L) 12/13/2019     No results found for: JCVINDEX, JCVANTIBODY  No  results found for: LI7SQMSV, ABSOLUTECD3, DB2AJXBA, ABSOLUTECD8, ZY1JQEJE, ABSOLUTECD4, LABCD48  Lab Results   Component Value Date    WBC 10.24 12/14/2019    RBC 4.12 12/14/2019    HGB 12.6 12/14/2019    HCT 37.8 12/14/2019    MCV 92 12/14/2019    MCH 30.6 12/14/2019    MCHC 33.3 12/14/2019    RDW 12.7 12/14/2019     12/14/2019    MPV 10.6 12/14/2019    GRAN 69.0 12/14/2019    LYMPH CANCELED 12/14/2019    LYMPH 24.0 12/14/2019    MONO CANCELED 12/14/2019    MONO 5.0 12/14/2019    EOS CANCELED 12/14/2019    BASO CANCELED 12/14/2019    EOSINOPHIL 0.0 12/14/2019    BASOPHIL 0.0 12/14/2019     Sodium   Date Value Ref Range Status   12/14/2019 143 136 - 145 mmol/L Final     Potassium   Date Value Ref Range Status   12/14/2019 4.1 3.5 - 5.1 mmol/L Final     Chloride   Date Value Ref Range Status   12/14/2019 108 95 - 110 mmol/L Final     CO2   Date Value Ref Range Status   12/14/2019 24 23 - 29 mmol/L Final     Glucose   Date Value Ref Range Status   12/14/2019 141 (H) 70 - 110 mg/dL Final     BUN, Bld   Date Value Ref Range Status   12/14/2019 15 6 - 20 mg/dL Final     Creatinine   Date Value Ref Range Status   12/14/2019 0.7 0.5 - 1.4 mg/dL Final     Calcium   Date Value Ref Range Status   12/14/2019 9.8 8.7 - 10.5 mg/dL Final     Total Protein   Date Value Ref Range Status   12/14/2019 6.9 6.0 - 8.4 g/dL Final     Albumin   Date Value Ref Range Status   12/14/2019 4.1 3.5 - 5.2 g/dL Final     Total Bilirubin   Date Value Ref Range Status   12/14/2019 0.4 0.1 - 1.0 mg/dL Final     Comment:     For infants and newborns, interpretation of results should be based  on gestational age, weight and in agreement with clinical  observations.  Premature Infant recommended reference ranges:  Up to 24 hours.............<8.0 mg/dL  Up to 48 hours............<12.0 mg/dL  3-5 days..................<15.0 mg/dL  6-29 days.................<15.0 mg/dL       Alkaline Phosphatase   Date Value Ref Range Status   12/14/2019 62 55 -  135 U/L Final     AST   Date Value Ref Range Status   12/14/2019 11 10 - 40 U/L Final     ALT   Date Value Ref Range Status   12/14/2019 25 10 - 44 U/L Final     Anion Gap   Date Value Ref Range Status   12/14/2019 11 8 - 16 mmol/L Final     eGFR if    Date Value Ref Range Status   12/14/2019 >60 >60 mL/min/1.73 m^2 Final     eGFR if non    Date Value Ref Range Status   12/14/2019 >60 >60 mL/min/1.73 m^2 Final     Comment:     Calculation used to obtain the estimated glomerular filtration  rate (eGFR) is the CKD-EPI equation.          Diagnosis/Assessment/Plan:     This is a 20-year-old female own who presented to an outside hospital for paresthesias in her lower extremities which later extended to abdomen and left hand.  She was admitted and treated for transverse myelitis for which she has been referred here for further evaluation.  Upon workup at outside hospital, MRI of the thoracic spine revealed multiple levels of increase T2 signal concerning for transverse myelitis.  There was questionable increased signal noted on MRI of the cervical spine although not officially documented per Radiology.  She was treated with IV Solu-Medrol for 5 days and her symptoms have mostly resolved.  CSF analysis revealed no oligoclonal bands, normal IgG index, normal WBC count.  She was also found to be B12 deficient and started on replacement.  She reported history of drug use including Whippets which she stopped since discharge.  Repeat imaging showed resolution of thoracic spine lesions (although slightly motion degraded) and no demyelinating lesions in brain which suggest her transverse myelitis was likely drug induced from Whippet use. She was educated to avoid recreational drugs. She has no new complaints and reports she is back to her baseline.      1. Transverse Myelitis  2. B12 deficiency  3. Vitamin D Insufficiency     Plan:  She was started on Cymbalta last visit for paresthesia which has  now resolved. We will continue it as she feels that it helps her mood. Referral to PCP for management of depression.  Continue Vitamin D replacement, management per PCP.  Will recheck B12 levels and restart supplement if levels are low.  She was advised to notify us if she develops new or worsening symptoms.   She can followup as needed.     Problem List Items Addressed This Visit     None      Visit Diagnoses     Low serum vitamin B12    -  Primary    Relevant Orders    Vitamin B12 (Completed)    Ambulatory consult to Internal Medicine    Encounter to establish care        Relevant Orders    Ambulatory consult to Internal Medicine    Depression, unspecified depression type        Relevant Orders    Ambulatory consult to Internal Medicine          Our visit today lasted 40 minutes, and 100% of this time was spent face to face with the patient. Over 50% of this visit included discussion of the treatment plan/medication changes/symptom management/exam findings/imaging results/coordination of care. The patient agrees with the plan of care.     Orlando Meyer MD  Neuroimmunology/MS Fellow  [facility]

## 2020-09-23 ENCOUNTER — PATIENT MESSAGE (OUTPATIENT)
Dept: NEUROLOGY | Facility: CLINIC | Age: 20
End: 2020-09-23

## 2020-09-23 NOTE — TELEPHONE ENCOUNTER
I called patient to discuss B12 level and to take 1000mcg B12 by mouth daily. I informed her that her level can be rechecked in 3 months.

## 2020-10-27 NOTE — PROGRESS NOTES
"[facility]    Subjective:       Patient ID: Denise Enriquez is a 20 y.o. female.    Chief Complaint: Establish Care      History was obtained from the patient and supplemented through chart review.  This patient has never been seen by an internal medicine physician with [facility] and is new to me.    HPI:    Patient is a 20 y.o. female with chronic medical problems including exercise-induced asthma, depression.  Recent hospitalization for parsethesias. Found to have profound B12 deficiency 2/2 overuse of "whip-its".  Now normalized, not doing whip-its and on natural supplement.    Depression  Hx of "manic depression" pt fears ?bipolar?  Due to rapid cycling   Largely happy with cymbalta but still wondering if additional meds/help would be needed    Diarrhea  Multiple times a week  No blood, no mucus  >3 years  Possibly triggered by alcohol, already on lactaid pills sometimes    Exercise induced asthma  Not very problematic  "Don't exercise very much"  Refill inhaler    Diet: burger and fries  Breakfast: cereal  Exercise: none    Medical History  Past Medical History:   Diagnosis Date    Depression     Exercise-induced asthma        Review of Systems   Constitutional: Negative for appetite change, diaphoresis and fever.   HENT: Negative for rhinorrhea and trouble swallowing.    Respiratory: Negative for shortness of breath.    Cardiovascular: Negative for chest pain.   Gastrointestinal: Positive for diarrhea. Negative for nausea and vomiting.   Genitourinary: Positive for vaginal discharge. Negative for hematuria.        Foul odor   Musculoskeletal: Negative for gait problem and myalgias.   Neurological: Negative for dizziness, seizures and weakness.   Psychiatric/Behavioral: Positive for dysphoric mood. Negative for hallucinations. The patient is not nervous/anxious.          Surgical hx, family hx, social hx   Have been reviewed      Current Outpatient Medications:     DULoxetine (CYMBALTA) 60 MG " "capsule, Take 60 mg by mouth once daily., Disp: , Rfl:     ergocalciferol (ERGOCALCIFEROL) 50,000 unit Cap, Take 1 capsule (50,000 Units total) by mouth every 7 days., Disp: 4 capsule, Rfl: 0    albuterol (VENTOLIN HFA) 90 mcg/actuation inhaler, Inhale 2 puffs into the lungs every 6 (six) hours as needed for Wheezing. Rescue, Disp: 18 g, Rfl: 1    diphth,pertus,acell,,tetanus (BOOSTRIX TDAP) 2.5-8-5 Lf-mcg-Lf/0.5mL Syrg injection, Inject 0.5 mLs into the muscle once. For one dose. for 1 dose, Disp: 0.5 mL, Rfl: 0    flu vacc [id number] 6mos up,PF, 60 mcg (15 mcg x 4)/0.5 mL Syrg, Inject 0.5 mLs into the muscle once. for 1 dose, Disp: 0.5 mL, Rfl: 0    Objective:        Body mass index is 24.64 kg/m².  Vitals:    10/28/20 0846   BP: 132/89   Pulse: 69   SpO2: 98%   Weight: 65.1 kg (143 lb 8.3 oz)   Height: 5' 4" (1.626 m)   PainSc: 0-No pain     Physical Exam  Vitals signs and nursing note reviewed.   Constitutional:       General: She is not in acute distress.     Appearance: Normal appearance. She is well-developed. She is not diaphoretic.   HENT:      Head: Normocephalic and atraumatic.   Eyes:      General: No scleral icterus.  Neck:      Musculoskeletal: Normal range of motion and neck supple.      Vascular: No JVD.   Cardiovascular:      Rate and Rhythm: Normal rate and regular rhythm.      Heart sounds: Normal heart sounds. No murmur.   Pulmonary:      Effort: Pulmonary effort is normal. No respiratory distress.      Breath sounds: Normal breath sounds. No wheezing or rales.   Abdominal:      General: There is no distension.      Palpations: Abdomen is soft. There is no mass.      Tenderness: There is no abdominal tenderness.   Musculoskeletal: Normal range of motion.   Skin:     General: Skin is warm and dry.      Capillary Refill: Capillary refill takes less than 2 seconds.   Neurological:      Mental Status: She is alert and oriented to person, place, and time.      Cranial Nerves: No cranial nerve " deficit.   Psychiatric:         Behavior: Behavior normal.           Lab Results   Component Value Date    WBC 10.24 12/14/2019    HGB 12.6 12/14/2019    HCT 37.8 12/14/2019     12/14/2019    ALT 21 10/28/2020    AST 20 10/28/2020     10/28/2020    K 4.0 10/28/2020     10/28/2020    CREATININE 0.8 10/28/2020    BUN 6 10/28/2020    CO2 24 10/28/2020    TSH 1.016 12/09/2019    INR 1.0 12/09/2019     None yet, ordered    (Imaging have been independently reviewed)  None    Assessment:         1. Exercise-induced asthma    2. Low serum vitamin B12    3. Encounter to establish care    4. Depression, unspecified depression type    5. Annual physical exam    6. Screening for HIV (human immunodeficiency virus)    7. Need for hepatitis C screening test    8. Exposure to STD          Plan:     Denise was seen today for establish care.    Diagnoses and all orders for this visit:    Exercise-induced asthma  -     albuterol (VENTOLIN HFA) 90 mcg/actuation inhaler; Inhale 2 puffs into the lungs every 6 (six) hours as needed for Wheezing. Rescue    Low serum vitamin B12  -     Ambulatory consult to Internal Medicine    Encounter to establish care  -     Ambulatory consult to Internal Medicine    Depression, unspecified depression type  -     Ambulatory consult to Internal Medicine  -     Ambulatory referral/consult to Psychiatry; Future    Annual physical exam  -     Comprehensive Metabolic Panel; Future  -     Lipid Panel; Future  -     Ambulatory referral/consult to Obstetrics / Gynecology; Future    Screening for HIV (human immunodeficiency virus)  -     HIV 1/2 Ag/Ab (4th Gen); Future    Need for hepatitis C screening test  -     Hepatitis C Antibody; Future    Exposure to STD  -     RPR; Future        Health Maintenance  - Lipids: ordered  - A1C: fasting glucose ordered  - Colon Ca Screen:  N/A due to age  - Immunizations: HPV vaccines UTD from childhood, records requested; need flu, ?tetanus    Women's  health  - Pap: N/A due to age  - Mammo:  N/A due to age  - Dexa:  N/A due to age  - Contraception: IUD needs to be replaced Feb      Follow up in about 5 months (around 3/28/2021).        All medications were reviewed including potential side effects and risks/benefits.  Pt was counseled to call back if anything worsens or if questions arise.    Pancho Machado MD  Family Medicine  [facility]  [address] [place] [id number]  Phone [phone]  Fax [phone]

## 2020-10-28 ENCOUNTER — LAB VISIT (OUTPATIENT)
Dept: LAB | Facility: OTHER | Age: 20
End: 2020-10-28
Attending: STUDENT IN AN ORGANIZED HEALTH CARE EDUCATION/TRAINING PROGRAM
Payer: COMMERCIAL

## 2020-10-28 ENCOUNTER — OFFICE VISIT (OUTPATIENT)
Dept: INTERNAL MEDICINE | Facility: CLINIC | Age: 20
End: 2020-10-28
Payer: COMMERCIAL

## 2020-10-28 VITALS
HEART RATE: 69 BPM | HEIGHT: 64 IN | SYSTOLIC BLOOD PRESSURE: 132 MMHG | DIASTOLIC BLOOD PRESSURE: 89 MMHG | OXYGEN SATURATION: 98 % | BODY MASS INDEX: 24.5 KG/M2 | WEIGHT: 143.5 LBS

## 2020-10-28 DIAGNOSIS — Z20.2 EXPOSURE TO STD: ICD-10-CM

## 2020-10-28 DIAGNOSIS — J45.990 EXERCISE-INDUCED ASTHMA: Primary | ICD-10-CM

## 2020-10-28 DIAGNOSIS — Z11.4 SCREENING FOR HIV (HUMAN IMMUNODEFICIENCY VIRUS): ICD-10-CM

## 2020-10-28 DIAGNOSIS — E53.8 LOW SERUM VITAMIN B12: ICD-10-CM

## 2020-10-28 DIAGNOSIS — Z11.59 NEED FOR HEPATITIS C SCREENING TEST: ICD-10-CM

## 2020-10-28 DIAGNOSIS — Z00.00 ANNUAL PHYSICAL EXAM: ICD-10-CM

## 2020-10-28 DIAGNOSIS — Z76.89 ENCOUNTER TO ESTABLISH CARE: ICD-10-CM

## 2020-10-28 DIAGNOSIS — F32.A DEPRESSION, UNSPECIFIED DEPRESSION TYPE: ICD-10-CM

## 2020-10-28 LAB
ALBUMIN SERPL BCP-MCNC: 4.7 G/DL (ref 3.5–5.2)
ALP SERPL-CCNC: 95 U/L (ref 55–135)
ALT SERPL W/O P-5'-P-CCNC: 21 U/L (ref 10–44)
ANION GAP SERPL CALC-SCNC: 11 MMOL/L (ref 8–16)
AST SERPL-CCNC: 20 U/L (ref 10–40)
BILIRUB SERPL-MCNC: 0.5 MG/DL (ref 0.1–1)
BUN SERPL-MCNC: 6 MG/DL (ref 6–20)
CALCIUM SERPL-MCNC: 9.7 MG/DL (ref 8.7–10.5)
CHLORIDE SERPL-SCNC: 108 MMOL/L (ref 95–110)
CHOLEST SERPL-MCNC: 175 MG/DL (ref 120–199)
CHOLEST/HDLC SERPL: 3 {RATIO} (ref 2–5)
CO2 SERPL-SCNC: 24 MMOL/L (ref 23–29)
CREAT SERPL-MCNC: 0.8 MG/DL (ref 0.5–1.4)
EST. GFR  (AFRICAN AMERICAN): >60 ML/MIN/1.73 M^2
EST. GFR  (NON AFRICAN AMERICAN): >60 ML/MIN/1.73 M^2
GLUCOSE SERPL-MCNC: 96 MG/DL (ref 70–110)
HDLC SERPL-MCNC: 58 MG/DL (ref 40–75)
HDLC SERPL: 33.1 % (ref 20–50)
LDLC SERPL CALC-MCNC: 100.8 MG/DL (ref 63–159)
NONHDLC SERPL-MCNC: 117 MG/DL
POTASSIUM SERPL-SCNC: 4 MMOL/L (ref 3.5–5.1)
PROT SERPL-MCNC: 7.6 G/DL (ref 6–8.4)
SODIUM SERPL-SCNC: 143 MMOL/L (ref 136–145)
TRIGL SERPL-MCNC: 81 MG/DL (ref 30–150)

## 2020-10-28 PROCEDURE — 99999 PR PBB SHADOW E&M-EST. PATIENT-LVL V: CPT | Mod: PBBFAC,,, | Performed by: STUDENT IN AN ORGANIZED HEALTH CARE EDUCATION/TRAINING PROGRAM

## 2020-10-28 PROCEDURE — 99385 PR PREVENTIVE VISIT,NEW,18-39: ICD-10-PCS | Mod: S$GLB,,, | Performed by: STUDENT IN AN ORGANIZED HEALTH CARE EDUCATION/TRAINING PROGRAM

## 2020-10-28 PROCEDURE — 86803 HEPATITIS C AB TEST: CPT

## 2020-10-28 PROCEDURE — 99999 PR PBB SHADOW E&M-EST. PATIENT-LVL V: ICD-10-PCS | Mod: PBBFAC,,, | Performed by: STUDENT IN AN ORGANIZED HEALTH CARE EDUCATION/TRAINING PROGRAM

## 2020-10-28 PROCEDURE — 99385 PREV VISIT NEW AGE 18-39: CPT | Mod: S$GLB,,, | Performed by: STUDENT IN AN ORGANIZED HEALTH CARE EDUCATION/TRAINING PROGRAM

## 2020-10-28 PROCEDURE — 86592 SYPHILIS TEST NON-TREP QUAL: CPT

## 2020-10-28 PROCEDURE — 80061 LIPID PANEL: CPT

## 2020-10-28 PROCEDURE — 36415 COLL VENOUS BLD VENIPUNCTURE: CPT

## 2020-10-28 PROCEDURE — 86703 HIV-1/HIV-2 1 RESULT ANTBDY: CPT

## 2020-10-28 PROCEDURE — 80053 COMPREHEN METABOLIC PANEL: CPT

## 2020-10-28 RX ORDER — ALBUTEROL SULFATE 90 UG/1
2 AEROSOL, METERED RESPIRATORY (INHALATION) EVERY 6 HOURS PRN
Qty: 18 G | Refills: 1 | Status: SHIPPED | OUTPATIENT
Start: 2020-10-28 | End: 2021-10-28

## 2020-10-28 NOTE — LETTER
October 28, 2020      Orlando Meyer MD  [address]  [facility] [address]           [facility] [address]  [facility] [address]  Phone: [phone]  Fax: [phone]          Patient: Denise Enriquez   MR Number: [id number]   YOB: 2000   Date of Visit: 10/28/2020       Dear Dr. Orlando Meyer:    Thank you for referring Denise Enriquez to me for evaluation. Attached you will find relevant portions of my assessment and plan of care.    If you have questions, please do not hesitate to call me. I look forward to following Denise Enriquez along with you.    Sincerely,    Pancho Machado MD    Enclosure  CC:  No Recipients    If you would like to receive this communication electronically, please contact [email or web address] or [phone] to request more information on [employer] access.    For providers and/or their staff who would like to refer a patient to [facility], please contact us through our one-stop-shop provider referral line, [facility], at [phone].    If you feel you have received this communication in error or would no longer like to receive these types of communications, please e-mail [email or web address]

## 2020-10-28 NOTE — PATIENT INSTRUCTIONS
Call to make an appointment within [facility] for psychiatry/psychology [phone]     Other psychiatrists:   Candace Hammond(psychiatrist) [address] [facility] [address] Phone: [phone]   Keith Montgomery (psychiatrist) [phone], [phone] [address] [facility]   Dr. Irineo Mar - [phone]   Dr. Kaylin Araiza - [phone]   Dr. Deidre Seay - [phone]   Dr. Dre Good - [phone]     [facility]: [phone]     Therapy/Psychology:   You can try anyone of these number to see if your insurance is accepted or you would have to call your insurance.     Cognitive Behavioral Therapy (CBT) Center [facility]   Address: [employer] [place] [address]   Phone: [phone]   [email or web address]     Integrated [facility] [address], [address]   Phone: [phone]   You can email for an appointment at: [email or web address]     [facility]   [address] [place] [address] 300, [facility], [address]   [email or web address]   Dr. Krissy Gilmore, [phone] or [email or web address]   Dr. Christel Matt, [phone] or [email or web address]     Lisa Quiroz    LCSW (therapist) [phone]   [address] [place] [address]   Karina Champagne   LCSW (therapist) [phone]   [address] [facility]   Alina Levin LCSW (therapist) [phone]   [address] [place] [address]   MARK Montgomery         LCSW                    [phone]   Jp Guerra [phone] (therapist) [address] [facility]   Amado Champagne (therapist) [phone]  [address] Maryam Nicholas (therapist) [phone] [address] [facility]     Behavior Health Counseling [phone]   [facility] Acosta[address]is[facility] [place] [address]     Employee Assistance Program (EAP)   Check through your employer's HR.     Online Therapist:     [email or web address]/     Free Guided Meditations   [email or web address]    [email or web address]   [email or web address]/pages/audio.[email or web address]

## 2020-10-29 ENCOUNTER — OFFICE VISIT (OUTPATIENT)
Dept: OBSTETRICS AND GYNECOLOGY | Facility: CLINIC | Age: 20
End: 2020-10-29
Payer: COMMERCIAL

## 2020-10-29 VITALS
HEIGHT: 64 IN | WEIGHT: 146.81 LBS | SYSTOLIC BLOOD PRESSURE: 98 MMHG | BODY MASS INDEX: 25.06 KG/M2 | DIASTOLIC BLOOD PRESSURE: 68 MMHG

## 2020-10-29 DIAGNOSIS — N76.0 RECURRENT VAGINITIS: ICD-10-CM

## 2020-10-29 DIAGNOSIS — Z11.3 SCREEN FOR STD (SEXUALLY TRANSMITTED DISEASE): ICD-10-CM

## 2020-10-29 DIAGNOSIS — Z01.419 WELL WOMAN EXAM WITH ROUTINE GYNECOLOGICAL EXAM: Primary | ICD-10-CM

## 2020-10-29 DIAGNOSIS — Z00.00 ANNUAL PHYSICAL EXAM: ICD-10-CM

## 2020-10-29 LAB
CANDIDA RRNA VAG QL PROBE: NEGATIVE
G VAGINALIS RRNA GENITAL QL PROBE: NEGATIVE
HCV AB SERPL QL IA: NEGATIVE
HIV 1+2 AB+HIV1 P24 AG SERPL QL IA: NEGATIVE
RPR SER QL: NORMAL
T VAGINALIS RRNA GENITAL QL PROBE: NEGATIVE

## 2020-10-29 PROCEDURE — 99385 PR PREVENTIVE VISIT,NEW,18-39: ICD-10-PCS | Mod: S$GLB,,, | Performed by: OBSTETRICS & GYNECOLOGY

## 2020-10-29 PROCEDURE — 99999 PR PBB SHADOW E&M-EST. PATIENT-LVL III: CPT | Mod: PBBFAC,,, | Performed by: OBSTETRICS & GYNECOLOGY

## 2020-10-29 PROCEDURE — 99385 PREV VISIT NEW AGE 18-39: CPT | Mod: S$GLB,,, | Performed by: OBSTETRICS & GYNECOLOGY

## 2020-10-29 PROCEDURE — 87510 GARDNER VAG DNA DIR PROBE: CPT

## 2020-10-29 PROCEDURE — 99999 PR PBB SHADOW E&M-EST. PATIENT-LVL III: ICD-10-PCS | Mod: PBBFAC,,, | Performed by: OBSTETRICS & GYNECOLOGY

## 2020-10-29 PROCEDURE — 87480 CANDIDA DNA DIR PROBE: CPT

## 2020-10-29 RX ORDER — CLINDAMYCIN PHOSPHATE 20 MG/G
CREAM VAGINAL
COMMUNITY
Start: 2020-08-06 | End: 2021-04-08

## 2020-10-29 NOTE — LETTER
October 29, 2020      Pancho Machado MD  [address]  [address]  [facility] [address]           [facility] [address]  [address] [facility] [address]  Phone: [phone]          Patient: Denise Enriquez   MR Number: [id number]   YOB: 2000   Date of Visit: 10/29/2020       Dear Dr. Pancho Machado:    Thank you for referring Denise Enriquez to me for evaluation. Attached you will find relevant portions of my assessment and plan of care.    If you have questions, please do not hesitate to call me. I look forward to following Denise Enriquez along with you.    Sincerely,    Nhan Butts MD    Enclosure  CC:  No Recipients    If you would like to receive this communication electronically, please contact [email or web address] or [phone] to request more information on [employer] Link access.    For providers and/or their staff who would like to refer a patient to [facility], please contact us through our one-stop-shop provider referral line, [facility], at [phone].    If you feel you have received this communication in error or would no longer like to receive these types of communications, please e-mail [email or web address]
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
## 2020-10-29 NOTE — PATIENT INSTRUCTIONS
Self-Help Tips for Vulvar Skin Care    While you are seeking effective treatment for vulvar pain, here are some coping measures to relieve symptoms and prevent further irritation. Even when your symptoms are under control, these guidelines are recommended as a preventive strategy.    Clothing and Laundry  ? Wear all-white cotton underwear.   ? Do not wear pantyhose (wear thigh high or knee high hose instead).   ? Wear loose-fitting pants or skirts.   ? Remove wet bathing suits and exercise clothing promptly.   ? Use dermatologically approved detergent such as Purex or Clear.  ? Double-rinse underwear and any other clothing that comes into contact with the vulva.   ? Do not use fabric softener on undergarments.  Hygiene  ? Use soft, white, unscented toilet paper.   ? Use lukewarm or cool sitz baths to relieve burning and irritation.   ? Avoid getting shampoo on the vulvar area.   ? Do not use bubble bath, feminine hygiene products, or any perfumed creams or soaps.   ? Wash the vulva with cool to lukewarm water only.  ? Rinse the vulva with water after urination.  ? Urinate before the bladder is full.   ? Prevent constipation by adding fiber to your diet (if necessary, use a psyllium product such as Metamucil) and drinking at least 8 glasses of water daily.  ? Use 100% cotton menstrual pads and tampons.  Sexual intercourse  ? Use a lubricant that is water soluble, e.g., Astroglide.  ? Ask your physician for a prescription for a topical anesthestic, e.g., Lidocaine gel 5%. (This may sting for the first 3-5 minutes after application.)  ? Apply ice or a frozen blue gel pack (lunch box size) wrapped in one layer of a hand towel to relieve burning after intercourse.   ? Urinate (to prevent infection) and rinse vulva with cool water after sexual intercourse.  ? Do not use contraceptive creams or spermicides.  Physical Activities  ? Avoid exercises that put direct pressure on the vulva such as bicycle riding and horseback  riding.  ? Limit intense exercises that create a lot of friction in the vulvar area (try lower intensity exercises such as walking).  ? Use a frozen gel pack wrapped in a towel to relieve symptoms after exercise.   ? Enroll in an exercise class such as yoga to learn stretching and relaxation exercises.  ? Don't swim in highly chlorinated pools.   ? Avoid the use of hot tubs.  Everyday Living  ? Use a foam rubber donut for long periods of sitting.   ? If you must sit all day at work, try to intersperse periods of standing (e.g. rearrange your office so that you can stand while you speak on the phone).   ? Learn some relaxation techniques to do during the day (The Relaxation and Stress Reduction Workbook by Varun Cunha and Jennifer or The Chronic Pain Control Workbook by Brad are recommended).

## 2020-10-31 LAB
C TRACH RRNA SPEC QL NAA+PROBE: NEGATIVE
N GONORRHOEA RRNA SPEC QL NAA+PROBE: NEGATIVE

## 2020-11-02 ENCOUNTER — IMMUNIZATION (OUTPATIENT)
Dept: PHARMACY | Facility: CLINIC | Age: 20
End: 2020-11-02
Payer: COMMERCIAL

## 2020-11-17 ENCOUNTER — DOCUMENTATION ONLY (OUTPATIENT)
Dept: INTERNAL MEDICINE | Facility: CLINIC | Age: 20
End: 2020-11-17

## 2020-11-18 NOTE — PROGRESS NOTES
"Requested records to determine immunizations and receieved the following information from when pt was seen for back pain:    Received records from December 2019 from [facility]    Mention of concern for Transverse Myelitis, potential for "MR suggested thoracic intracord lesions. ?MS no history of optic neuritis"    B12 deficiency    Degenerative disc disease, cervical    Degenerative Disc Disease at L5-S1 level    ----------  As an update, pt described initially on presentation to me that her B12 deficiency was due to use of too many "whip-its"  "

## 2021-03-02 ENCOUNTER — OFFICE VISIT (OUTPATIENT)
Dept: INTERNAL MEDICINE | Facility: CLINIC | Age: 21
End: 2021-03-02
Payer: COMMERCIAL

## 2021-03-02 VITALS
BODY MASS INDEX: 24.13 KG/M2 | HEART RATE: 85 BPM | WEIGHT: 141.31 LBS | HEIGHT: 64 IN | SYSTOLIC BLOOD PRESSURE: 120 MMHG | OXYGEN SATURATION: 96 % | DIASTOLIC BLOOD PRESSURE: 62 MMHG

## 2021-03-02 DIAGNOSIS — F32.A DEPRESSION, UNSPECIFIED DEPRESSION TYPE: ICD-10-CM

## 2021-03-02 DIAGNOSIS — L03.90 CELLULITIS, UNSPECIFIED CELLULITIS SITE: Primary | ICD-10-CM

## 2021-03-02 DIAGNOSIS — L60.9 NAIL ABNORMALITY: ICD-10-CM

## 2021-03-02 DIAGNOSIS — J45.990 EXERCISE-INDUCED ASTHMA: ICD-10-CM

## 2021-03-02 PROBLEM — Z86.39 HX OF NON ANEMIC VITAMIN B12 DEFICIENCY: Status: ACTIVE | Noted: 2019-12-10

## 2021-03-02 PROCEDURE — 99999 PR PBB SHADOW E&M-EST. PATIENT-LVL III: ICD-10-PCS | Mod: PBBFAC,,, | Performed by: STUDENT IN AN ORGANIZED HEALTH CARE EDUCATION/TRAINING PROGRAM

## 2021-03-02 PROCEDURE — 99999 PR PBB SHADOW E&M-EST. PATIENT-LVL III: CPT | Mod: PBBFAC,,, | Performed by: STUDENT IN AN ORGANIZED HEALTH CARE EDUCATION/TRAINING PROGRAM

## 2021-03-02 PROCEDURE — 3008F PR BODY MASS INDEX (BMI) DOCUMENTED: ICD-10-PCS | Mod: CPTII,S$GLB,, | Performed by: STUDENT IN AN ORGANIZED HEALTH CARE EDUCATION/TRAINING PROGRAM

## 2021-03-02 PROCEDURE — 1126F PR PAIN SEVERITY QUANTIFIED, NO PAIN PRESENT: ICD-10-PCS | Mod: S$GLB,,, | Performed by: STUDENT IN AN ORGANIZED HEALTH CARE EDUCATION/TRAINING PROGRAM

## 2021-03-02 PROCEDURE — 3008F BODY MASS INDEX DOCD: CPT | Mod: CPTII,S$GLB,, | Performed by: STUDENT IN AN ORGANIZED HEALTH CARE EDUCATION/TRAINING PROGRAM

## 2021-03-02 PROCEDURE — 99213 OFFICE O/P EST LOW 20 MIN: CPT | Mod: S$GLB,,, | Performed by: STUDENT IN AN ORGANIZED HEALTH CARE EDUCATION/TRAINING PROGRAM

## 2021-03-02 PROCEDURE — 1126F AMNT PAIN NOTED NONE PRSNT: CPT | Mod: S$GLB,,, | Performed by: STUDENT IN AN ORGANIZED HEALTH CARE EDUCATION/TRAINING PROGRAM

## 2021-03-02 PROCEDURE — 99213 PR OFFICE/OUTPT VISIT, EST, LEVL III, 20-29 MIN: ICD-10-PCS | Mod: S$GLB,,, | Performed by: STUDENT IN AN ORGANIZED HEALTH CARE EDUCATION/TRAINING PROGRAM

## 2021-03-02 RX ORDER — LAMOTRIGINE 25 MG/1
150 TABLET ORAL DAILY
COMMUNITY
Start: 2020-12-30 | End: 2021-05-19

## 2021-03-02 RX ORDER — CEPHALEXIN 500 MG/1
500 CAPSULE ORAL EVERY 6 HOURS
Qty: 20 CAPSULE | Refills: 0 | Status: SHIPPED | OUTPATIENT
Start: 2021-03-02 | End: 2021-03-07

## 2021-04-08 ENCOUNTER — HOSPITAL ENCOUNTER (EMERGENCY)
Facility: OTHER | Age: 21
Discharge: PSYCHIATRIC OR SUBSTANCE USE FACILITY | End: 2021-04-08
Attending: EMERGENCY MEDICINE
Payer: COMMERCIAL

## 2021-04-08 ENCOUNTER — OFFICE VISIT (OUTPATIENT)
Dept: INTERNAL MEDICINE | Facility: CLINIC | Age: 21
End: 2021-04-08
Payer: COMMERCIAL

## 2021-04-08 VITALS
OXYGEN SATURATION: 100 % | WEIGHT: 142.19 LBS | SYSTOLIC BLOOD PRESSURE: 110 MMHG | HEIGHT: 64 IN | DIASTOLIC BLOOD PRESSURE: 68 MMHG | BODY MASS INDEX: 24.28 KG/M2 | HEART RATE: 76 BPM

## 2021-04-08 VITALS
HEIGHT: 64 IN | BODY MASS INDEX: 24.24 KG/M2 | OXYGEN SATURATION: 100 % | RESPIRATION RATE: 18 BRPM | DIASTOLIC BLOOD PRESSURE: 66 MMHG | TEMPERATURE: 99 F | WEIGHT: 142 LBS | SYSTOLIC BLOOD PRESSURE: 123 MMHG | HEART RATE: 65 BPM

## 2021-04-08 DIAGNOSIS — F32.A DEPRESSION, UNSPECIFIED DEPRESSION TYPE: ICD-10-CM

## 2021-04-08 DIAGNOSIS — F32.A DEPRESSION WITH SUICIDAL IDEATION: Primary | ICD-10-CM

## 2021-04-08 DIAGNOSIS — R45.851 DEPRESSION WITH SUICIDAL IDEATION: Primary | ICD-10-CM

## 2021-04-08 DIAGNOSIS — F31.9 BIPOLAR AFFECTIVE DISORDER, REMISSION STATUS UNSPECIFIED: ICD-10-CM

## 2021-04-08 LAB
ALBUMIN SERPL BCP-MCNC: 4.4 G/DL (ref 3.5–5.2)
ALP SERPL-CCNC: 72 U/L (ref 55–135)
ALT SERPL W/O P-5'-P-CCNC: 32 U/L (ref 10–44)
AMPHET+METHAMPHET UR QL: NEGATIVE
ANION GAP SERPL CALC-SCNC: 8 MMOL/L (ref 8–16)
AST SERPL-CCNC: 20 U/L (ref 10–40)
B-HCG UR QL: NEGATIVE
BARBITURATES UR QL SCN>200 NG/ML: NEGATIVE
BASOPHILS # BLD AUTO: 0.09 K/UL (ref 0–0.2)
BASOPHILS NFR BLD: 1.1 % (ref 0–1.9)
BENZODIAZ UR QL SCN>200 NG/ML: NORMAL
BILIRUB SERPL-MCNC: 0.8 MG/DL (ref 0.1–1)
BILIRUB UR QL STRIP: NEGATIVE
BUN SERPL-MCNC: 9 MG/DL (ref 6–20)
BZE UR QL SCN: NEGATIVE
CALCIUM SERPL-MCNC: 9.5 MG/DL (ref 8.7–10.5)
CANNABINOIDS UR QL SCN: NORMAL
CHLORIDE SERPL-SCNC: 109 MMOL/L (ref 95–110)
CLARITY UR: CLEAR
CO2 SERPL-SCNC: 23 MMOL/L (ref 23–29)
COLOR UR: YELLOW
CREAT SERPL-MCNC: 0.7 MG/DL (ref 0.5–1.4)
CREAT UR-MCNC: 91.3 MG/DL (ref 15–325)
CTP QC/QA: YES
CTP QC/QA: YES
DIFFERENTIAL METHOD: NORMAL
EOSINOPHIL # BLD AUTO: 0.1 K/UL (ref 0–0.5)
EOSINOPHIL NFR BLD: 1 % (ref 0–8)
ERYTHROCYTE [DISTWIDTH] IN BLOOD BY AUTOMATED COUNT: 12.8 % (ref 11.5–14.5)
EST. GFR  (AFRICAN AMERICAN): >60 ML/MIN/1.73 M^2
EST. GFR  (NON AFRICAN AMERICAN): >60 ML/MIN/1.73 M^2
ETHANOL SERPL-MCNC: <10 MG/DL
GLUCOSE SERPL-MCNC: 97 MG/DL (ref 70–110)
GLUCOSE UR QL STRIP: NEGATIVE
HCT VFR BLD AUTO: 41.8 % (ref 37–48.5)
HGB BLD-MCNC: 14 G/DL (ref 12–16)
HGB UR QL STRIP: ABNORMAL
IMM GRANULOCYTES # BLD AUTO: 0.04 K/UL (ref 0–0.04)
IMM GRANULOCYTES NFR BLD AUTO: 0.5 % (ref 0–0.5)
KETONES UR QL STRIP: NEGATIVE
LEUKOCYTE ESTERASE UR QL STRIP: NEGATIVE
LYMPHOCYTES # BLD AUTO: 2.7 K/UL (ref 1–4.8)
LYMPHOCYTES NFR BLD: 31.9 % (ref 18–48)
MCH RBC QN AUTO: 30.4 PG (ref 27–31)
MCHC RBC AUTO-ENTMCNC: 33.5 G/DL (ref 32–36)
MCV RBC AUTO: 91 FL (ref 82–98)
METHADONE UR QL SCN>300 NG/ML: NEGATIVE
MONOCYTES # BLD AUTO: 0.7 K/UL (ref 0.3–1)
MONOCYTES NFR BLD: 8 % (ref 4–15)
NEUTROPHILS # BLD AUTO: 4.8 K/UL (ref 1.8–7.7)
NEUTROPHILS NFR BLD: 57.5 % (ref 38–73)
NITRITE UR QL STRIP: NEGATIVE
NRBC BLD-RTO: 0 /100 WBC
OPIATES UR QL SCN: NEGATIVE
PCP UR QL SCN>25 NG/ML: NEGATIVE
PH UR STRIP: 6 [PH] (ref 5–8)
PLATELET # BLD AUTO: 328 K/UL (ref 150–450)
PMV BLD AUTO: 10.4 FL (ref 9.2–12.9)
POTASSIUM SERPL-SCNC: 3.8 MMOL/L (ref 3.5–5.1)
PROT SERPL-MCNC: 7.1 G/DL (ref 6–8.4)
PROT UR QL STRIP: NEGATIVE
RBC # BLD AUTO: 4.61 M/UL (ref 4–5.4)
SARS-COV-2 RDRP RESP QL NAA+PROBE: NEGATIVE
SODIUM SERPL-SCNC: 140 MMOL/L (ref 136–145)
SP GR UR STRIP: 1.02 (ref 1–1.03)
TOXICOLOGY INFORMATION: NORMAL
URN SPEC COLLECT METH UR: ABNORMAL
UROBILINOGEN UR STRIP-ACNC: NEGATIVE EU/DL
WBC # BLD AUTO: 8.37 K/UL (ref 3.9–12.7)

## 2021-04-08 PROCEDURE — 3008F BODY MASS INDEX DOCD: CPT | Mod: CPTII,S$GLB,, | Performed by: STUDENT IN AN ORGANIZED HEALTH CARE EDUCATION/TRAINING PROGRAM

## 2021-04-08 PROCEDURE — 99999 PR PBB SHADOW E&M-EST. PATIENT-LVL III: ICD-10-PCS | Mod: PBBFAC,,, | Performed by: STUDENT IN AN ORGANIZED HEALTH CARE EDUCATION/TRAINING PROGRAM

## 2021-04-08 PROCEDURE — 3008F PR BODY MASS INDEX (BMI) DOCUMENTED: ICD-10-PCS | Mod: CPTII,S$GLB,, | Performed by: STUDENT IN AN ORGANIZED HEALTH CARE EDUCATION/TRAINING PROGRAM

## 2021-04-08 PROCEDURE — G0427 PR INPT TELEHEALTH CON 70/>M: ICD-10-PCS | Mod: 95,,, | Performed by: PSYCHIATRY & NEUROLOGY

## 2021-04-08 PROCEDURE — 99999 PR PBB SHADOW E&M-EST. PATIENT-LVL III: CPT | Mod: PBBFAC,,, | Performed by: STUDENT IN AN ORGANIZED HEALTH CARE EDUCATION/TRAINING PROGRAM

## 2021-04-08 PROCEDURE — 80307 DRUG TEST PRSMV CHEM ANLYZR: CPT | Performed by: EMERGENCY MEDICINE

## 2021-04-08 PROCEDURE — 99285 EMERGENCY DEPT VISIT HI MDM: CPT

## 2021-04-08 PROCEDURE — U0002 COVID-19 LAB TEST NON-CDC: HCPCS | Performed by: EMERGENCY MEDICINE

## 2021-04-08 PROCEDURE — 81003 URINALYSIS AUTO W/O SCOPE: CPT | Mod: 59 | Performed by: EMERGENCY MEDICINE

## 2021-04-08 PROCEDURE — 80053 COMPREHEN METABOLIC PANEL: CPT | Performed by: EMERGENCY MEDICINE

## 2021-04-08 PROCEDURE — 1126F AMNT PAIN NOTED NONE PRSNT: CPT | Mod: S$GLB,,, | Performed by: STUDENT IN AN ORGANIZED HEALTH CARE EDUCATION/TRAINING PROGRAM

## 2021-04-08 PROCEDURE — G0427 INPT/ED TELECONSULT70: HCPCS | Mod: 95,,, | Performed by: PSYCHIATRY & NEUROLOGY

## 2021-04-08 PROCEDURE — 1126F PR PAIN SEVERITY QUANTIFIED, NO PAIN PRESENT: ICD-10-PCS | Mod: S$GLB,,, | Performed by: STUDENT IN AN ORGANIZED HEALTH CARE EDUCATION/TRAINING PROGRAM

## 2021-04-08 PROCEDURE — 82077 ASSAY SPEC XCP UR&BREATH IA: CPT | Performed by: EMERGENCY MEDICINE

## 2021-04-08 PROCEDURE — 99215 OFFICE O/P EST HI 40 MIN: CPT | Mod: S$GLB,,, | Performed by: STUDENT IN AN ORGANIZED HEALTH CARE EDUCATION/TRAINING PROGRAM

## 2021-04-08 PROCEDURE — 81025 URINE PREGNANCY TEST: CPT | Performed by: EMERGENCY MEDICINE

## 2021-04-08 PROCEDURE — 99215 PR OFFICE/OUTPT VISIT, EST, LEVL V, 40-54 MIN: ICD-10-PCS | Mod: S$GLB,,, | Performed by: STUDENT IN AN ORGANIZED HEALTH CARE EDUCATION/TRAINING PROGRAM

## 2021-04-08 PROCEDURE — 85025 COMPLETE CBC W/AUTO DIFF WBC: CPT | Performed by: EMERGENCY MEDICINE

## 2021-04-09 PROBLEM — Z13.9 ENCOUNTER FOR MEDICAL SCREENING EXAMINATION: Status: ACTIVE | Noted: 2021-04-09

## 2021-04-12 PROBLEM — F43.10 PTSD (POST-TRAUMATIC STRESS DISORDER): Status: ACTIVE | Noted: 2021-04-12

## 2021-04-12 PROBLEM — F10.10 ALCOHOL ABUSE: Status: ACTIVE | Noted: 2021-04-12

## 2021-04-21 ENCOUNTER — PATIENT OUTREACH (OUTPATIENT)
Dept: ADMINISTRATIVE | Facility: OTHER | Age: 21
End: 2021-04-21

## 2021-04-22 ENCOUNTER — OFFICE VISIT (OUTPATIENT)
Dept: INTERNAL MEDICINE | Facility: CLINIC | Age: 21
End: 2021-04-22
Payer: COMMERCIAL

## 2021-04-22 ENCOUNTER — OFFICE VISIT (OUTPATIENT)
Dept: OBSTETRICS AND GYNECOLOGY | Facility: CLINIC | Age: 21
End: 2021-04-22
Payer: COMMERCIAL

## 2021-04-22 VITALS
HEIGHT: 64 IN | OXYGEN SATURATION: 95 % | WEIGHT: 142.63 LBS | DIASTOLIC BLOOD PRESSURE: 82 MMHG | HEART RATE: 90 BPM | BODY MASS INDEX: 24.35 KG/M2 | SYSTOLIC BLOOD PRESSURE: 108 MMHG

## 2021-04-22 VITALS
WEIGHT: 145.5 LBS | BODY MASS INDEX: 24.84 KG/M2 | SYSTOLIC BLOOD PRESSURE: 115 MMHG | HEIGHT: 64 IN | DIASTOLIC BLOOD PRESSURE: 76 MMHG

## 2021-04-22 DIAGNOSIS — Z01.419 WELL WOMAN EXAM WITH ROUTINE GYNECOLOGICAL EXAM: Primary | ICD-10-CM

## 2021-04-22 DIAGNOSIS — Z11.3 SCREEN FOR STD (SEXUALLY TRANSMITTED DISEASE): ICD-10-CM

## 2021-04-22 DIAGNOSIS — N89.8 VAGINAL DISCHARGE: ICD-10-CM

## 2021-04-22 DIAGNOSIS — F32.A DEPRESSION, UNSPECIFIED DEPRESSION TYPE: Primary | ICD-10-CM

## 2021-04-22 DIAGNOSIS — Z86.39 HX OF NON ANEMIC VITAMIN B12 DEFICIENCY: ICD-10-CM

## 2021-04-22 DIAGNOSIS — F31.81 BIPOLAR II DISORDER: ICD-10-CM

## 2021-04-22 DIAGNOSIS — Z71.85 HPV VACCINE COUNSELING: ICD-10-CM

## 2021-04-22 DIAGNOSIS — F10.10 ALCOHOL ABUSE: ICD-10-CM

## 2021-04-22 PROBLEM — R45.851 DEPRESSION WITH SUICIDAL IDEATION: Status: RESOLVED | Noted: 2021-04-08 | Resolved: 2021-04-22

## 2021-04-22 PROBLEM — Z13.9 ENCOUNTER FOR MEDICAL SCREENING EXAMINATION: Status: RESOLVED | Noted: 2021-04-09 | Resolved: 2021-04-22

## 2021-04-22 PROCEDURE — 87661 TRICHOMONAS VAGINALIS AMPLIF: CPT | Performed by: OBSTETRICS & GYNECOLOGY

## 2021-04-22 PROCEDURE — 1126F AMNT PAIN NOTED NONE PRSNT: CPT | Mod: S$GLB,,, | Performed by: OBSTETRICS & GYNECOLOGY

## 2021-04-22 PROCEDURE — 99401 PR PREVENT COUNSEL,INDIV,15 MIN: ICD-10-PCS | Mod: S$GLB,,, | Performed by: OBSTETRICS & GYNECOLOGY

## 2021-04-22 PROCEDURE — 99214 OFFICE O/P EST MOD 30 MIN: CPT | Mod: S$GLB,,, | Performed by: STUDENT IN AN ORGANIZED HEALTH CARE EDUCATION/TRAINING PROGRAM

## 2021-04-22 PROCEDURE — 1126F PR PAIN SEVERITY QUANTIFIED, NO PAIN PRESENT: ICD-10-PCS | Mod: S$GLB,,, | Performed by: STUDENT IN AN ORGANIZED HEALTH CARE EDUCATION/TRAINING PROGRAM

## 2021-04-22 PROCEDURE — 1126F PR PAIN SEVERITY QUANTIFIED, NO PAIN PRESENT: ICD-10-PCS | Mod: S$GLB,,, | Performed by: OBSTETRICS & GYNECOLOGY

## 2021-04-22 PROCEDURE — 1126F AMNT PAIN NOTED NONE PRSNT: CPT | Mod: S$GLB,,, | Performed by: STUDENT IN AN ORGANIZED HEALTH CARE EDUCATION/TRAINING PROGRAM

## 2021-04-22 PROCEDURE — 99999 PR PBB SHADOW E&M-EST. PATIENT-LVL III: ICD-10-PCS | Mod: PBBFAC,,, | Performed by: OBSTETRICS & GYNECOLOGY

## 2021-04-22 PROCEDURE — 99395 PREV VISIT EST AGE 18-39: CPT | Mod: 25,S$GLB,, | Performed by: OBSTETRICS & GYNECOLOGY

## 2021-04-22 PROCEDURE — 3008F PR BODY MASS INDEX (BMI) DOCUMENTED: ICD-10-PCS | Mod: CPTII,S$GLB,, | Performed by: STUDENT IN AN ORGANIZED HEALTH CARE EDUCATION/TRAINING PROGRAM

## 2021-04-22 PROCEDURE — 87481 CANDIDA DNA AMP PROBE: CPT | Mod: 59 | Performed by: OBSTETRICS & GYNECOLOGY

## 2021-04-22 PROCEDURE — 3008F BODY MASS INDEX DOCD: CPT | Mod: CPTII,S$GLB,, | Performed by: OBSTETRICS & GYNECOLOGY

## 2021-04-22 PROCEDURE — 99999 PR PBB SHADOW E&M-EST. PATIENT-LVL III: CPT | Mod: PBBFAC,,, | Performed by: STUDENT IN AN ORGANIZED HEALTH CARE EDUCATION/TRAINING PROGRAM

## 2021-04-22 PROCEDURE — 99999 PR PBB SHADOW E&M-EST. PATIENT-LVL III: ICD-10-PCS | Mod: PBBFAC,,, | Performed by: STUDENT IN AN ORGANIZED HEALTH CARE EDUCATION/TRAINING PROGRAM

## 2021-04-22 PROCEDURE — 99395 PR PREVENTIVE VISIT,EST,18-39: ICD-10-PCS | Mod: 25,S$GLB,, | Performed by: OBSTETRICS & GYNECOLOGY

## 2021-04-22 PROCEDURE — 3008F BODY MASS INDEX DOCD: CPT | Mod: CPTII,S$GLB,, | Performed by: STUDENT IN AN ORGANIZED HEALTH CARE EDUCATION/TRAINING PROGRAM

## 2021-04-22 PROCEDURE — 99401 PREV MED CNSL INDIV APPRX 15: CPT | Mod: S$GLB,,, | Performed by: OBSTETRICS & GYNECOLOGY

## 2021-04-22 PROCEDURE — 99999 PR PBB SHADOW E&M-EST. PATIENT-LVL III: CPT | Mod: PBBFAC,,, | Performed by: OBSTETRICS & GYNECOLOGY

## 2021-04-22 PROCEDURE — 99214 PR OFFICE/OUTPT VISIT, EST, LEVL IV, 30-39 MIN: ICD-10-PCS | Mod: S$GLB,,, | Performed by: STUDENT IN AN ORGANIZED HEALTH CARE EDUCATION/TRAINING PROGRAM

## 2021-04-22 PROCEDURE — 3008F PR BODY MASS INDEX (BMI) DOCUMENTED: ICD-10-PCS | Mod: CPTII,S$GLB,, | Performed by: OBSTETRICS & GYNECOLOGY

## 2021-04-22 RX ORDER — METRONIDAZOLE 7.5 MG/G
1 GEL VAGINAL
Qty: 70 G | Refills: 5 | Status: SHIPPED | OUTPATIENT
Start: 2021-04-22 | End: 2021-06-09

## 2021-04-23 LAB
BACTERIAL VAGINOSIS DNA: NEGATIVE
C TRACH RRNA SPEC QL NAA+PROBE: NEGATIVE
CANDIDA GLABRATA DNA: NEGATIVE
CANDIDA KRUSEI DNA: NEGATIVE
CANDIDA RRNA VAG QL PROBE: NEGATIVE
N GONORRHOEA RRNA SPEC QL NAA+PROBE: NEGATIVE
T VAGINALIS RRNA GENITAL QL PROBE: NEGATIVE

## 2021-05-18 ENCOUNTER — PATIENT MESSAGE (OUTPATIENT)
Dept: INTERNAL MEDICINE | Facility: CLINIC | Age: 21
End: 2021-05-18

## 2021-05-19 ENCOUNTER — LAB VISIT (OUTPATIENT)
Dept: LAB | Facility: OTHER | Age: 21
End: 2021-05-19
Attending: STUDENT IN AN ORGANIZED HEALTH CARE EDUCATION/TRAINING PROGRAM
Payer: COMMERCIAL

## 2021-05-19 ENCOUNTER — OFFICE VISIT (OUTPATIENT)
Dept: INTERNAL MEDICINE | Facility: CLINIC | Age: 21
End: 2021-05-19
Payer: COMMERCIAL

## 2021-05-19 VITALS
BODY MASS INDEX: 24.5 KG/M2 | SYSTOLIC BLOOD PRESSURE: 114 MMHG | WEIGHT: 143.5 LBS | OXYGEN SATURATION: 99 % | TEMPERATURE: 98 F | DIASTOLIC BLOOD PRESSURE: 58 MMHG | HEART RATE: 101 BPM | HEIGHT: 64 IN

## 2021-05-19 DIAGNOSIS — R68.83 CHILLS: Primary | ICD-10-CM

## 2021-05-19 DIAGNOSIS — R50.9 FEVER, UNSPECIFIED FEVER CAUSE: ICD-10-CM

## 2021-05-19 DIAGNOSIS — R68.83 CHILLS: ICD-10-CM

## 2021-05-19 LAB
ALBUMIN SERPL BCP-MCNC: 3.9 G/DL (ref 3.5–5.2)
ALP SERPL-CCNC: 89 U/L (ref 55–135)
ALT SERPL W/O P-5'-P-CCNC: 23 U/L (ref 10–44)
ANION GAP SERPL CALC-SCNC: 9 MMOL/L (ref 8–16)
AST SERPL-CCNC: 25 U/L (ref 10–40)
BASOPHILS # BLD AUTO: 0.05 K/UL (ref 0–0.2)
BASOPHILS NFR BLD: 0.4 % (ref 0–1.9)
BILIRUB SERPL-MCNC: 0.9 MG/DL (ref 0.1–1)
BUN SERPL-MCNC: 6 MG/DL (ref 6–20)
CALCIUM SERPL-MCNC: 9.5 MG/DL (ref 8.7–10.5)
CHLORIDE SERPL-SCNC: 103 MMOL/L (ref 95–110)
CO2 SERPL-SCNC: 26 MMOL/L (ref 23–29)
CREAT SERPL-MCNC: 0.8 MG/DL (ref 0.5–1.4)
CTP QC/QA: YES
DIFFERENTIAL METHOD: ABNORMAL
EOSINOPHIL # BLD AUTO: 0 K/UL (ref 0–0.5)
EOSINOPHIL NFR BLD: 0.2 % (ref 0–8)
ERYTHROCYTE [DISTWIDTH] IN BLOOD BY AUTOMATED COUNT: 12 % (ref 11.5–14.5)
EST. GFR  (AFRICAN AMERICAN): >60 ML/MIN/1.73 M^2
EST. GFR  (NON AFRICAN AMERICAN): >60 ML/MIN/1.73 M^2
FLUAV AG NPH QL: NEGATIVE
FLUBV AG NPH QL: NEGATIVE
GLUCOSE SERPL-MCNC: 97 MG/DL (ref 70–110)
HCT VFR BLD AUTO: 38.4 % (ref 37–48.5)
HETEROPH AB SERPL QL IA: NEGATIVE
HGB BLD-MCNC: 13.2 G/DL (ref 12–16)
IMM GRANULOCYTES # BLD AUTO: 0.1 K/UL (ref 0–0.04)
IMM GRANULOCYTES NFR BLD AUTO: 0.7 % (ref 0–0.5)
LYMPHOCYTES # BLD AUTO: 1.7 K/UL (ref 1–4.8)
LYMPHOCYTES NFR BLD: 12.5 % (ref 18–48)
MCH RBC QN AUTO: 30.6 PG (ref 27–31)
MCHC RBC AUTO-ENTMCNC: 34.4 G/DL (ref 32–36)
MCV RBC AUTO: 89 FL (ref 82–98)
MONOCYTES # BLD AUTO: 1.3 K/UL (ref 0.3–1)
MONOCYTES NFR BLD: 9.5 % (ref 4–15)
NEUTROPHILS # BLD AUTO: 10.7 K/UL (ref 1.8–7.7)
NEUTROPHILS NFR BLD: 76.7 % (ref 38–73)
NRBC BLD-RTO: 0 /100 WBC
PLATELET # BLD AUTO: 367 K/UL (ref 150–450)
PMV BLD AUTO: 10.6 FL (ref 9.2–12.9)
POTASSIUM SERPL-SCNC: 3.6 MMOL/L (ref 3.5–5.1)
PROT SERPL-MCNC: 7.6 G/DL (ref 6–8.4)
RBC # BLD AUTO: 4.32 M/UL (ref 4–5.4)
SODIUM SERPL-SCNC: 138 MMOL/L (ref 136–145)
WBC # BLD AUTO: 13.96 K/UL (ref 3.9–12.7)

## 2021-05-19 PROCEDURE — 96372 THER/PROPH/DIAG INJ SC/IM: CPT | Mod: S$GLB,,, | Performed by: STUDENT IN AN ORGANIZED HEALTH CARE EDUCATION/TRAINING PROGRAM

## 2021-05-19 PROCEDURE — 96372 PR INJECTION,THERAP/PROPH/DIAG2ST, IM OR SUBCUT: ICD-10-PCS | Mod: S$GLB,,, | Performed by: STUDENT IN AN ORGANIZED HEALTH CARE EDUCATION/TRAINING PROGRAM

## 2021-05-19 PROCEDURE — 3008F PR BODY MASS INDEX (BMI) DOCUMENTED: ICD-10-PCS | Mod: CPTII,S$GLB,, | Performed by: STUDENT IN AN ORGANIZED HEALTH CARE EDUCATION/TRAINING PROGRAM

## 2021-05-19 PROCEDURE — 87804 POCT INFLUENZA A/B: ICD-10-PCS | Mod: 59,QW,S$GLB, | Performed by: STUDENT IN AN ORGANIZED HEALTH CARE EDUCATION/TRAINING PROGRAM

## 2021-05-19 PROCEDURE — 36415 COLL VENOUS BLD VENIPUNCTURE: CPT | Performed by: STUDENT IN AN ORGANIZED HEALTH CARE EDUCATION/TRAINING PROGRAM

## 2021-05-19 PROCEDURE — U0003 INFECTIOUS AGENT DETECTION BY NUCLEIC ACID (DNA OR RNA); SEVERE ACUTE RESPIRATORY SYNDROME CORONAVIRUS 2 (SARS-COV-2) (CORONAVIRUS DISEASE [COVID-19]), AMPLIFIED PROBE TECHNIQUE, MAKING USE OF HIGH THROUGHPUT TECHNOLOGIES AS DESCRIBED BY CMS-2020-01-R: HCPCS | Performed by: STUDENT IN AN ORGANIZED HEALTH CARE EDUCATION/TRAINING PROGRAM

## 2021-05-19 PROCEDURE — 99999 PR PBB SHADOW E&M-EST. PATIENT-LVL III: CPT | Mod: PBBFAC,,, | Performed by: STUDENT IN AN ORGANIZED HEALTH CARE EDUCATION/TRAINING PROGRAM

## 2021-05-19 PROCEDURE — 99999 PR PBB SHADOW E&M-EST. PATIENT-LVL III: ICD-10-PCS | Mod: PBBFAC,,, | Performed by: STUDENT IN AN ORGANIZED HEALTH CARE EDUCATION/TRAINING PROGRAM

## 2021-05-19 PROCEDURE — 99214 OFFICE O/P EST MOD 30 MIN: CPT | Mod: 25,S$GLB,, | Performed by: STUDENT IN AN ORGANIZED HEALTH CARE EDUCATION/TRAINING PROGRAM

## 2021-05-19 PROCEDURE — 87804 INFLUENZA ASSAY W/OPTIC: CPT | Mod: QW,S$GLB,, | Performed by: STUDENT IN AN ORGANIZED HEALTH CARE EDUCATION/TRAINING PROGRAM

## 2021-05-19 PROCEDURE — 86308 HETEROPHILE ANTIBODY SCREEN: CPT | Performed by: STUDENT IN AN ORGANIZED HEALTH CARE EDUCATION/TRAINING PROGRAM

## 2021-05-19 PROCEDURE — U0005 INFEC AGEN DETEC AMPLI PROBE: HCPCS | Performed by: STUDENT IN AN ORGANIZED HEALTH CARE EDUCATION/TRAINING PROGRAM

## 2021-05-19 PROCEDURE — 1125F PR PAIN SEVERITY QUANTIFIED, PAIN PRESENT: ICD-10-PCS | Mod: S$GLB,,, | Performed by: STUDENT IN AN ORGANIZED HEALTH CARE EDUCATION/TRAINING PROGRAM

## 2021-05-19 PROCEDURE — 3008F BODY MASS INDEX DOCD: CPT | Mod: CPTII,S$GLB,, | Performed by: STUDENT IN AN ORGANIZED HEALTH CARE EDUCATION/TRAINING PROGRAM

## 2021-05-19 PROCEDURE — 99214 PR OFFICE/OUTPT VISIT, EST, LEVL IV, 30-39 MIN: ICD-10-PCS | Mod: 25,S$GLB,, | Performed by: STUDENT IN AN ORGANIZED HEALTH CARE EDUCATION/TRAINING PROGRAM

## 2021-05-19 PROCEDURE — 1125F AMNT PAIN NOTED PAIN PRSNT: CPT | Mod: S$GLB,,, | Performed by: STUDENT IN AN ORGANIZED HEALTH CARE EDUCATION/TRAINING PROGRAM

## 2021-05-19 PROCEDURE — 80053 COMPREHEN METABOLIC PANEL: CPT | Performed by: STUDENT IN AN ORGANIZED HEALTH CARE EDUCATION/TRAINING PROGRAM

## 2021-05-19 PROCEDURE — 85025 COMPLETE CBC W/AUTO DIFF WBC: CPT | Performed by: STUDENT IN AN ORGANIZED HEALTH CARE EDUCATION/TRAINING PROGRAM

## 2021-05-19 RX ORDER — TRIAMCINOLONE ACETONIDE 40 MG/ML
40 INJECTION, SUSPENSION INTRA-ARTICULAR; INTRAMUSCULAR ONCE
Status: COMPLETED | OUTPATIENT
Start: 2021-05-19 | End: 2021-05-19

## 2021-05-19 RX ADMIN — TRIAMCINOLONE ACETONIDE 40 MG: 40 INJECTION, SUSPENSION INTRA-ARTICULAR; INTRAMUSCULAR at 03:05

## 2021-05-20 ENCOUNTER — PATIENT MESSAGE (OUTPATIENT)
Dept: INTERNAL MEDICINE | Facility: CLINIC | Age: 21
End: 2021-05-20

## 2021-05-20 LAB — SARS-COV-2 RNA RESP QL NAA+PROBE: NOT DETECTED

## 2021-05-21 ENCOUNTER — PATIENT MESSAGE (OUTPATIENT)
Dept: INTERNAL MEDICINE | Facility: CLINIC | Age: 21
End: 2021-05-21

## 2021-05-26 ENCOUNTER — PATIENT MESSAGE (OUTPATIENT)
Dept: INTERNAL MEDICINE | Facility: CLINIC | Age: 21
End: 2021-05-26

## 2021-05-26 DIAGNOSIS — R19.7 DIARRHEA, UNSPECIFIED TYPE: Primary | ICD-10-CM

## 2021-05-27 ENCOUNTER — OFFICE VISIT (OUTPATIENT)
Dept: GASTROENTEROLOGY | Facility: CLINIC | Age: 21
End: 2021-05-27
Payer: COMMERCIAL

## 2021-05-27 ENCOUNTER — PATIENT MESSAGE (OUTPATIENT)
Dept: INTERNAL MEDICINE | Facility: CLINIC | Age: 21
End: 2021-05-27

## 2021-05-27 ENCOUNTER — LAB VISIT (OUTPATIENT)
Dept: LAB | Facility: HOSPITAL | Age: 21
End: 2021-05-27
Payer: COMMERCIAL

## 2021-05-27 ENCOUNTER — TELEPHONE (OUTPATIENT)
Dept: INTERNAL MEDICINE | Facility: CLINIC | Age: 21
End: 2021-05-27

## 2021-05-27 VITALS
DIASTOLIC BLOOD PRESSURE: 65 MMHG | HEIGHT: 64 IN | WEIGHT: 142.63 LBS | HEART RATE: 82 BPM | SYSTOLIC BLOOD PRESSURE: 114 MMHG | BODY MASS INDEX: 24.35 KG/M2

## 2021-05-27 DIAGNOSIS — R19.7 DIARRHEA, UNSPECIFIED TYPE: ICD-10-CM

## 2021-05-27 DIAGNOSIS — R00.2 PALPITATION: ICD-10-CM

## 2021-05-27 DIAGNOSIS — R10.9 ABDOMINAL CRAMPING: Primary | ICD-10-CM

## 2021-05-27 LAB
IGA SERPL-MCNC: 171 MG/DL (ref 40–350)
TSH SERPL DL<=0.005 MIU/L-ACNC: 0.92 UIU/ML (ref 0.4–4)

## 2021-05-27 PROCEDURE — 82784 ASSAY IGA/IGD/IGG/IGM EACH: CPT | Performed by: NURSE PRACTITIONER

## 2021-05-27 PROCEDURE — 36415 COLL VENOUS BLD VENIPUNCTURE: CPT | Performed by: NURSE PRACTITIONER

## 2021-05-27 PROCEDURE — 3008F PR BODY MASS INDEX (BMI) DOCUMENTED: ICD-10-PCS | Mod: CPTII,S$GLB,, | Performed by: NURSE PRACTITIONER

## 2021-05-27 PROCEDURE — 99204 OFFICE O/P NEW MOD 45 MIN: CPT | Mod: S$GLB,,, | Performed by: NURSE PRACTITIONER

## 2021-05-27 PROCEDURE — 1125F PR PAIN SEVERITY QUANTIFIED, PAIN PRESENT: ICD-10-PCS | Mod: S$GLB,,, | Performed by: NURSE PRACTITIONER

## 2021-05-27 PROCEDURE — 83516 IMMUNOASSAY NONANTIBODY: CPT | Performed by: NURSE PRACTITIONER

## 2021-05-27 PROCEDURE — 3008F BODY MASS INDEX DOCD: CPT | Mod: CPTII,S$GLB,, | Performed by: NURSE PRACTITIONER

## 2021-05-27 PROCEDURE — 84443 ASSAY THYROID STIM HORMONE: CPT | Performed by: NURSE PRACTITIONER

## 2021-05-27 PROCEDURE — 1125F AMNT PAIN NOTED PAIN PRSNT: CPT | Mod: S$GLB,,, | Performed by: NURSE PRACTITIONER

## 2021-05-27 PROCEDURE — 99999 PR PBB SHADOW E&M-EST. PATIENT-LVL IV: CPT | Mod: PBBFAC,,, | Performed by: NURSE PRACTITIONER

## 2021-05-27 PROCEDURE — 99999 PR PBB SHADOW E&M-EST. PATIENT-LVL IV: ICD-10-PCS | Mod: PBBFAC,,, | Performed by: NURSE PRACTITIONER

## 2021-05-27 PROCEDURE — 99204 PR OFFICE/OUTPT VISIT, NEW, LEVL IV, 45-59 MIN: ICD-10-PCS | Mod: S$GLB,,, | Performed by: NURSE PRACTITIONER

## 2021-05-27 RX ORDER — DICYCLOMINE HYDROCHLORIDE 10 MG/1
10 CAPSULE ORAL
Qty: 90 CAPSULE | Refills: 3 | Status: SHIPPED | OUTPATIENT
Start: 2021-05-27

## 2021-05-31 ENCOUNTER — PATIENT MESSAGE (OUTPATIENT)
Dept: OBSTETRICS AND GYNECOLOGY | Facility: CLINIC | Age: 21
End: 2021-05-31

## 2021-06-01 ENCOUNTER — APPOINTMENT (OUTPATIENT)
Dept: LAB | Facility: HOSPITAL | Age: 21
End: 2021-06-01
Payer: COMMERCIAL

## 2021-06-01 ENCOUNTER — PATIENT MESSAGE (OUTPATIENT)
Dept: OBSTETRICS AND GYNECOLOGY | Facility: CLINIC | Age: 21
End: 2021-06-01

## 2021-06-01 LAB — TTG IGA SER-ACNC: 4 UNITS

## 2021-06-14 ENCOUNTER — PATIENT OUTREACH (OUTPATIENT)
Dept: ADMINISTRATIVE | Facility: OTHER | Age: 21
End: 2021-06-14

## 2021-06-14 DIAGNOSIS — R00.2 PALPITATIONS: Primary | ICD-10-CM

## 2021-06-17 ENCOUNTER — OFFICE VISIT (OUTPATIENT)
Dept: CARDIOLOGY | Facility: CLINIC | Age: 21
End: 2021-06-17
Payer: COMMERCIAL

## 2021-06-17 ENCOUNTER — HOSPITAL ENCOUNTER (OUTPATIENT)
Dept: CARDIOLOGY | Facility: CLINIC | Age: 21
Discharge: HOME | End: 2021-06-17
Payer: COMMERCIAL

## 2021-06-17 VITALS
WEIGHT: 143.06 LBS | SYSTOLIC BLOOD PRESSURE: 117 MMHG | HEIGHT: 64 IN | BODY MASS INDEX: 24.42 KG/M2 | DIASTOLIC BLOOD PRESSURE: 65 MMHG | HEART RATE: 75 BPM

## 2021-06-17 DIAGNOSIS — F31.81 BIPOLAR II DISORDER: ICD-10-CM

## 2021-06-17 DIAGNOSIS — F32.89 OTHER DEPRESSION: ICD-10-CM

## 2021-06-17 DIAGNOSIS — R00.2 PALPITATIONS: ICD-10-CM

## 2021-06-17 DIAGNOSIS — F10.10 ALCOHOL ABUSE: Primary | ICD-10-CM

## 2021-06-17 DIAGNOSIS — J45.990 EXERCISE-INDUCED ASTHMA: ICD-10-CM

## 2021-06-17 DIAGNOSIS — R10.9 ABDOMINAL CRAMPING: ICD-10-CM

## 2021-06-17 PROCEDURE — 1126F AMNT PAIN NOTED NONE PRSNT: CPT | Mod: S$GLB,,, | Performed by: INTERNAL MEDICINE

## 2021-06-17 PROCEDURE — 3008F BODY MASS INDEX DOCD: CPT | Mod: CPTII,S$GLB,, | Performed by: INTERNAL MEDICINE

## 2021-06-17 PROCEDURE — 93010 ELECTROCARDIOGRAM REPORT: CPT | Mod: S$GLB,,, | Performed by: INTERNAL MEDICINE

## 2021-06-17 PROCEDURE — 99203 PR OFFICE/OUTPT VISIT, NEW, LEVL III, 30-44 MIN: ICD-10-PCS | Mod: S$GLB,,, | Performed by: INTERNAL MEDICINE

## 2021-06-17 PROCEDURE — 99999 PR PBB SHADOW E&M-EST. PATIENT-LVL IV: ICD-10-PCS | Mod: PBBFAC,,, | Performed by: INTERNAL MEDICINE

## 2021-06-17 PROCEDURE — 93010 EKG 12-LEAD: ICD-10-PCS | Mod: S$GLB,,, | Performed by: INTERNAL MEDICINE

## 2021-06-17 PROCEDURE — 99999 PR PBB SHADOW E&M-EST. PATIENT-LVL IV: CPT | Mod: PBBFAC,,, | Performed by: INTERNAL MEDICINE

## 2021-06-17 PROCEDURE — 3008F PR BODY MASS INDEX (BMI) DOCUMENTED: ICD-10-PCS | Mod: CPTII,S$GLB,, | Performed by: INTERNAL MEDICINE

## 2021-06-17 PROCEDURE — 93005 EKG 12-LEAD: ICD-10-PCS | Mod: S$GLB,,, | Performed by: INTERNAL MEDICINE

## 2021-06-17 PROCEDURE — 1126F PR PAIN SEVERITY QUANTIFIED, NO PAIN PRESENT: ICD-10-PCS | Mod: S$GLB,,, | Performed by: INTERNAL MEDICINE

## 2021-06-17 PROCEDURE — 99203 OFFICE O/P NEW LOW 30 MIN: CPT | Mod: S$GLB,,, | Performed by: INTERNAL MEDICINE

## 2021-06-17 PROCEDURE — 93005 ELECTROCARDIOGRAM TRACING: CPT | Mod: S$GLB,,, | Performed by: INTERNAL MEDICINE

## 2021-08-09 ENCOUNTER — OFFICE VISIT (OUTPATIENT)
Dept: INTERNAL MEDICINE | Facility: CLINIC | Age: 21
End: 2021-08-09
Payer: COMMERCIAL

## 2021-08-09 VITALS
DIASTOLIC BLOOD PRESSURE: 65 MMHG | WEIGHT: 153.44 LBS | SYSTOLIC BLOOD PRESSURE: 137 MMHG | BODY MASS INDEX: 26.2 KG/M2 | HEART RATE: 80 BPM | HEIGHT: 64 IN | OXYGEN SATURATION: 95 %

## 2021-08-09 DIAGNOSIS — F43.10 PTSD (POST-TRAUMATIC STRESS DISORDER): ICD-10-CM

## 2021-08-09 DIAGNOSIS — F32.89 OTHER DEPRESSION: ICD-10-CM

## 2021-08-09 DIAGNOSIS — L08.9 SKIN INFECTION: Primary | ICD-10-CM

## 2021-08-09 DIAGNOSIS — F31.81 BIPOLAR II DISORDER: ICD-10-CM

## 2021-08-09 DIAGNOSIS — F10.10 ALCOHOL ABUSE: ICD-10-CM

## 2021-08-09 DIAGNOSIS — Z86.39 HX OF NON ANEMIC VITAMIN B12 DEFICIENCY: ICD-10-CM

## 2021-08-09 DIAGNOSIS — G95.9 MYELOPATHY: ICD-10-CM

## 2021-08-09 PROCEDURE — 1159F MED LIST DOCD IN RCRD: CPT | Mod: CPTII,S$GLB,, | Performed by: STUDENT IN AN ORGANIZED HEALTH CARE EDUCATION/TRAINING PROGRAM

## 2021-08-09 PROCEDURE — 3075F SYST BP GE 130 - 139MM HG: CPT | Mod: CPTII,S$GLB,, | Performed by: STUDENT IN AN ORGANIZED HEALTH CARE EDUCATION/TRAINING PROGRAM

## 2021-08-09 PROCEDURE — 3078F PR MOST RECENT DIASTOLIC BLOOD PRESSURE < 80 MM HG: ICD-10-PCS | Mod: CPTII,S$GLB,, | Performed by: STUDENT IN AN ORGANIZED HEALTH CARE EDUCATION/TRAINING PROGRAM

## 2021-08-09 PROCEDURE — 3078F DIAST BP <80 MM HG: CPT | Mod: CPTII,S$GLB,, | Performed by: STUDENT IN AN ORGANIZED HEALTH CARE EDUCATION/TRAINING PROGRAM

## 2021-08-09 PROCEDURE — 1159F PR MEDICATION LIST DOCUMENTED IN MEDICAL RECORD: ICD-10-PCS | Mod: CPTII,S$GLB,, | Performed by: STUDENT IN AN ORGANIZED HEALTH CARE EDUCATION/TRAINING PROGRAM

## 2021-08-09 PROCEDURE — 99214 PR OFFICE/OUTPT VISIT, EST, LEVL IV, 30-39 MIN: ICD-10-PCS | Mod: S$GLB,,, | Performed by: STUDENT IN AN ORGANIZED HEALTH CARE EDUCATION/TRAINING PROGRAM

## 2021-08-09 PROCEDURE — 99999 PR PBB SHADOW E&M-EST. PATIENT-LVL III: ICD-10-PCS | Mod: PBBFAC,,, | Performed by: STUDENT IN AN ORGANIZED HEALTH CARE EDUCATION/TRAINING PROGRAM

## 2021-08-09 PROCEDURE — 3008F PR BODY MASS INDEX (BMI) DOCUMENTED: ICD-10-PCS | Mod: CPTII,S$GLB,, | Performed by: STUDENT IN AN ORGANIZED HEALTH CARE EDUCATION/TRAINING PROGRAM

## 2021-08-09 PROCEDURE — 99999 PR PBB SHADOW E&M-EST. PATIENT-LVL III: CPT | Mod: PBBFAC,,, | Performed by: STUDENT IN AN ORGANIZED HEALTH CARE EDUCATION/TRAINING PROGRAM

## 2021-08-09 PROCEDURE — 3075F PR MOST RECENT SYSTOLIC BLOOD PRESS GE 130-139MM HG: ICD-10-PCS | Mod: CPTII,S$GLB,, | Performed by: STUDENT IN AN ORGANIZED HEALTH CARE EDUCATION/TRAINING PROGRAM

## 2021-08-09 PROCEDURE — 3008F BODY MASS INDEX DOCD: CPT | Mod: CPTII,S$GLB,, | Performed by: STUDENT IN AN ORGANIZED HEALTH CARE EDUCATION/TRAINING PROGRAM

## 2021-08-09 PROCEDURE — 99214 OFFICE O/P EST MOD 30 MIN: CPT | Mod: S$GLB,,, | Performed by: STUDENT IN AN ORGANIZED HEALTH CARE EDUCATION/TRAINING PROGRAM

## 2021-08-09 RX ORDER — DOXYCYCLINE HYCLATE 100 MG
100 TABLET ORAL 2 TIMES DAILY
Qty: 10 TABLET | Refills: 0 | Status: SHIPPED | OUTPATIENT
Start: 2021-08-09

## 2021-08-25 ENCOUNTER — PATIENT OUTREACH (OUTPATIENT)
Dept: ADMINISTRATIVE | Facility: OTHER | Age: 21
End: 2021-08-25

## 2021-09-27 ENCOUNTER — PATIENT OUTREACH (OUTPATIENT)
Dept: ADMINISTRATIVE | Facility: HOSPITAL | Age: 21
End: 2021-09-27

## 2021-09-27 ENCOUNTER — PATIENT MESSAGE (OUTPATIENT)
Dept: ADMINISTRATIVE | Facility: HOSPITAL | Age: 21
End: 2021-09-27

## 2021-10-04 ENCOUNTER — PATIENT MESSAGE (OUTPATIENT)
Dept: ADMINISTRATIVE | Facility: HOSPITAL | Age: 21
End: 2021-10-04

## 2021-10-18 ENCOUNTER — OFFICE VISIT (OUTPATIENT)
Dept: INTERNAL MEDICINE | Facility: CLINIC | Age: 21
End: 2021-10-18
Payer: COMMERCIAL

## 2021-10-18 VITALS
DIASTOLIC BLOOD PRESSURE: 80 MMHG | WEIGHT: 179.25 LBS | HEIGHT: 64 IN | SYSTOLIC BLOOD PRESSURE: 122 MMHG | BODY MASS INDEX: 30.6 KG/M2

## 2021-10-18 DIAGNOSIS — M25.571 ACUTE RIGHT ANKLE PAIN: Primary | ICD-10-CM

## 2021-10-18 PROCEDURE — 3079F PR MOST RECENT DIASTOLIC BLOOD PRESSURE 80-89 MM HG: ICD-10-PCS | Mod: CPTII,S$GLB,, | Performed by: STUDENT IN AN ORGANIZED HEALTH CARE EDUCATION/TRAINING PROGRAM

## 2021-10-18 PROCEDURE — 3008F PR BODY MASS INDEX (BMI) DOCUMENTED: ICD-10-PCS | Mod: CPTII,S$GLB,, | Performed by: STUDENT IN AN ORGANIZED HEALTH CARE EDUCATION/TRAINING PROGRAM

## 2021-10-18 PROCEDURE — 1159F MED LIST DOCD IN RCRD: CPT | Mod: CPTII,S$GLB,, | Performed by: STUDENT IN AN ORGANIZED HEALTH CARE EDUCATION/TRAINING PROGRAM

## 2021-10-18 PROCEDURE — 99999 PR PBB SHADOW E&M-EST. PATIENT-LVL III: ICD-10-PCS | Mod: PBBFAC,,, | Performed by: STUDENT IN AN ORGANIZED HEALTH CARE EDUCATION/TRAINING PROGRAM

## 2021-10-18 PROCEDURE — 1159F PR MEDICATION LIST DOCUMENTED IN MEDICAL RECORD: ICD-10-PCS | Mod: CPTII,S$GLB,, | Performed by: STUDENT IN AN ORGANIZED HEALTH CARE EDUCATION/TRAINING PROGRAM

## 2021-10-18 PROCEDURE — 3008F BODY MASS INDEX DOCD: CPT | Mod: CPTII,S$GLB,, | Performed by: STUDENT IN AN ORGANIZED HEALTH CARE EDUCATION/TRAINING PROGRAM

## 2021-10-18 PROCEDURE — 3074F SYST BP LT 130 MM HG: CPT | Mod: CPTII,S$GLB,, | Performed by: STUDENT IN AN ORGANIZED HEALTH CARE EDUCATION/TRAINING PROGRAM

## 2021-10-18 PROCEDURE — 99214 OFFICE O/P EST MOD 30 MIN: CPT | Mod: S$GLB,,, | Performed by: STUDENT IN AN ORGANIZED HEALTH CARE EDUCATION/TRAINING PROGRAM

## 2021-10-18 PROCEDURE — 3074F PR MOST RECENT SYSTOLIC BLOOD PRESSURE < 130 MM HG: ICD-10-PCS | Mod: CPTII,S$GLB,, | Performed by: STUDENT IN AN ORGANIZED HEALTH CARE EDUCATION/TRAINING PROGRAM

## 2021-10-18 PROCEDURE — 3079F DIAST BP 80-89 MM HG: CPT | Mod: CPTII,S$GLB,, | Performed by: STUDENT IN AN ORGANIZED HEALTH CARE EDUCATION/TRAINING PROGRAM

## 2021-10-18 PROCEDURE — 99214 PR OFFICE/OUTPT VISIT, EST, LEVL IV, 30-39 MIN: ICD-10-PCS | Mod: S$GLB,,, | Performed by: STUDENT IN AN ORGANIZED HEALTH CARE EDUCATION/TRAINING PROGRAM

## 2021-10-18 PROCEDURE — 99999 PR PBB SHADOW E&M-EST. PATIENT-LVL III: CPT | Mod: PBBFAC,,, | Performed by: STUDENT IN AN ORGANIZED HEALTH CARE EDUCATION/TRAINING PROGRAM

## 2021-10-18 RX ORDER — IBUPROFEN 800 MG/1
800 TABLET ORAL 3 TIMES DAILY PRN
Qty: 15 TABLET | Refills: 0 | Status: SHIPPED | OUTPATIENT
Start: 2021-10-18 | End: 2021-10-21

## 2021-10-19 ENCOUNTER — TELEPHONE (OUTPATIENT)
Dept: INTERNAL MEDICINE | Facility: CLINIC | Age: 21
End: 2021-10-19

## 2021-10-19 ENCOUNTER — HOSPITAL ENCOUNTER (OUTPATIENT)
Dept: RADIOLOGY | Facility: OTHER | Age: 21
Discharge: HOME | End: 2021-10-19
Attending: STUDENT IN AN ORGANIZED HEALTH CARE EDUCATION/TRAINING PROGRAM
Payer: COMMERCIAL

## 2021-10-19 DIAGNOSIS — M25.571 ACUTE RIGHT ANKLE PAIN: ICD-10-CM

## 2021-10-19 DIAGNOSIS — M25.579 ANKLE PAIN, UNSPECIFIED CHRONICITY, UNSPECIFIED LATERALITY: Primary | ICD-10-CM

## 2021-10-19 PROCEDURE — 73610 XR ANKLE COMPLETE 3 VIEW RIGHT: ICD-10-PCS | Mod: 26,RT,, | Performed by: INTERNAL MEDICINE

## 2021-10-19 PROCEDURE — 73610 X-RAY EXAM OF ANKLE: CPT | Mod: TC,FY,RT

## 2021-10-19 PROCEDURE — 73610 X-RAY EXAM OF ANKLE: CPT | Mod: 26,RT,, | Performed by: INTERNAL MEDICINE

## 2021-10-21 ENCOUNTER — PATIENT OUTREACH (OUTPATIENT)
Dept: ADMINISTRATIVE | Facility: OTHER | Age: 21
End: 2021-10-21

## 2021-10-22 ENCOUNTER — OFFICE VISIT (OUTPATIENT)
Dept: ORTHOPEDICS | Facility: CLINIC | Age: 21
End: 2021-10-22
Payer: COMMERCIAL

## 2021-10-22 VITALS — BODY MASS INDEX: 30.07 KG/M2 | WEIGHT: 176.13 LBS | HEIGHT: 64 IN

## 2021-10-22 DIAGNOSIS — M25.579 ANKLE PAIN, UNSPECIFIED CHRONICITY, UNSPECIFIED LATERALITY: ICD-10-CM

## 2021-10-22 DIAGNOSIS — M25.571 PAIN IN JOINT INVOLVING RIGHT ANKLE AND FOOT: ICD-10-CM

## 2021-10-22 DIAGNOSIS — S86.301A PERONEAL TENDON INJURY, RIGHT, INITIAL ENCOUNTER: Primary | ICD-10-CM

## 2021-10-22 PROCEDURE — 1159F PR MEDICATION LIST DOCUMENTED IN MEDICAL RECORD: ICD-10-PCS | Mod: CPTII,S$GLB,, | Performed by: ORTHOPAEDIC SURGERY

## 2021-10-22 PROCEDURE — 99204 OFFICE O/P NEW MOD 45 MIN: CPT | Mod: S$GLB,,, | Performed by: ORTHOPAEDIC SURGERY

## 2021-10-22 PROCEDURE — 99999 PR PBB SHADOW E&M-EST. PATIENT-LVL III: ICD-10-PCS | Mod: PBBFAC,,, | Performed by: ORTHOPAEDIC SURGERY

## 2021-10-22 PROCEDURE — 99204 PR OFFICE/OUTPT VISIT, NEW, LEVL IV, 45-59 MIN: ICD-10-PCS | Mod: S$GLB,,, | Performed by: ORTHOPAEDIC SURGERY

## 2021-10-22 PROCEDURE — 1160F PR REVIEW ALL MEDS BY PRESCRIBER/CLIN PHARMACIST DOCUMENTED: ICD-10-PCS | Mod: CPTII,S$GLB,, | Performed by: ORTHOPAEDIC SURGERY

## 2021-10-22 PROCEDURE — 3008F BODY MASS INDEX DOCD: CPT | Mod: CPTII,S$GLB,, | Performed by: ORTHOPAEDIC SURGERY

## 2021-10-22 PROCEDURE — 3008F PR BODY MASS INDEX (BMI) DOCUMENTED: ICD-10-PCS | Mod: CPTII,S$GLB,, | Performed by: ORTHOPAEDIC SURGERY

## 2021-10-22 PROCEDURE — 99999 PR PBB SHADOW E&M-EST. PATIENT-LVL III: CPT | Mod: PBBFAC,,, | Performed by: ORTHOPAEDIC SURGERY

## 2021-10-22 PROCEDURE — 1160F RVW MEDS BY RX/DR IN RCRD: CPT | Mod: CPTII,S$GLB,, | Performed by: ORTHOPAEDIC SURGERY

## 2021-10-22 PROCEDURE — 1159F MED LIST DOCD IN RCRD: CPT | Mod: CPTII,S$GLB,, | Performed by: ORTHOPAEDIC SURGERY

## 2021-10-25 ENCOUNTER — CLINICAL SUPPORT (OUTPATIENT)
Dept: REHABILITATION | Facility: HOSPITAL | Age: 21
End: 2021-10-25
Payer: COMMERCIAL

## 2021-10-25 DIAGNOSIS — M25.571 ACUTE RIGHT ANKLE PAIN: ICD-10-CM

## 2021-10-25 PROCEDURE — 97162 PT EVAL MOD COMPLEX 30 MIN: CPT | Mod: PN

## 2021-10-26 PROBLEM — M25.571 ACUTE RIGHT ANKLE PAIN: Status: ACTIVE | Noted: 2021-10-26

## 2021-10-28 ENCOUNTER — TELEPHONE (OUTPATIENT)
Dept: REHABILITATION | Facility: HOSPITAL | Age: 21
End: 2021-10-28
Payer: COMMERCIAL

## 2021-10-29 ENCOUNTER — CLINICAL SUPPORT (OUTPATIENT)
Dept: REHABILITATION | Facility: HOSPITAL | Age: 21
End: 2021-10-29
Payer: COMMERCIAL

## 2021-10-29 DIAGNOSIS — M25.571 ACUTE RIGHT ANKLE PAIN: ICD-10-CM

## 2021-10-29 PROCEDURE — 97110 THERAPEUTIC EXERCISES: CPT | Mod: PO,CQ

## 2021-10-29 PROCEDURE — 97140 MANUAL THERAPY 1/> REGIONS: CPT | Mod: PO,CQ

## 2021-10-30 ENCOUNTER — HOSPITAL ENCOUNTER (OUTPATIENT)
Dept: RADIOLOGY | Facility: OTHER | Age: 21
Discharge: HOME | End: 2021-10-30
Attending: ORTHOPAEDIC SURGERY
Payer: COMMERCIAL

## 2021-10-30 DIAGNOSIS — M25.571 PAIN IN JOINT INVOLVING RIGHT ANKLE AND FOOT: ICD-10-CM

## 2021-10-30 DIAGNOSIS — M25.579 ANKLE PAIN, UNSPECIFIED CHRONICITY, UNSPECIFIED LATERALITY: ICD-10-CM

## 2021-10-30 DIAGNOSIS — S86.301A PERONEAL TENDON INJURY, RIGHT, INITIAL ENCOUNTER: ICD-10-CM

## 2021-10-30 PROCEDURE — 73721 MRI ANKLE WITHOUT CONTRAST RIGHT: ICD-10-PCS | Mod: 26,RT,, | Performed by: RADIOLOGY

## 2021-10-30 PROCEDURE — 73721 MRI JNT OF LWR EXTRE W/O DYE: CPT | Mod: TC,RT

## 2021-10-30 PROCEDURE — 73721 MRI JNT OF LWR EXTRE W/O DYE: CPT | Mod: 26,RT,, | Performed by: RADIOLOGY

## 2021-11-01 ENCOUNTER — OFFICE VISIT (OUTPATIENT)
Dept: ORTHOPEDICS | Facility: CLINIC | Age: 21
End: 2021-11-01
Payer: COMMERCIAL

## 2021-11-01 ENCOUNTER — TELEPHONE (OUTPATIENT)
Dept: ORTHOPEDICS | Facility: CLINIC | Age: 21
End: 2021-11-01

## 2021-11-01 ENCOUNTER — DOCUMENTATION ONLY (OUTPATIENT)
Dept: REHABILITATION | Facility: HOSPITAL | Age: 21
End: 2021-11-01
Payer: COMMERCIAL

## 2021-11-01 DIAGNOSIS — S93.401D MODERATE ANKLE SPRAIN, RIGHT, SUBSEQUENT ENCOUNTER: ICD-10-CM

## 2021-11-01 DIAGNOSIS — T14.8XXA CONTUSION OF BONE: Primary | ICD-10-CM

## 2021-11-01 DIAGNOSIS — M76.71 TENDINITIS OF RIGHT PERONEUS BREVIS TENDON: ICD-10-CM

## 2021-11-01 PROCEDURE — 1159F MED LIST DOCD IN RCRD: CPT | Mod: CPTII,95,, | Performed by: ORTHOPAEDIC SURGERY

## 2021-11-01 PROCEDURE — 1160F RVW MEDS BY RX/DR IN RCRD: CPT | Mod: CPTII,95,, | Performed by: ORTHOPAEDIC SURGERY

## 2021-11-01 PROCEDURE — 1160F PR REVIEW ALL MEDS BY PRESCRIBER/CLIN PHARMACIST DOCUMENTED: ICD-10-PCS | Mod: CPTII,95,, | Performed by: ORTHOPAEDIC SURGERY

## 2021-11-01 PROCEDURE — 99212 PR OFFICE/OUTPT VISIT, EST, LEVL II, 10-19 MIN: ICD-10-PCS | Mod: 95,,, | Performed by: ORTHOPAEDIC SURGERY

## 2021-11-01 PROCEDURE — 99212 OFFICE O/P EST SF 10 MIN: CPT | Mod: 95,,, | Performed by: ORTHOPAEDIC SURGERY

## 2021-11-01 PROCEDURE — 1159F PR MEDICATION LIST DOCUMENTED IN MEDICAL RECORD: ICD-10-PCS | Mod: CPTII,95,, | Performed by: ORTHOPAEDIC SURGERY

## 2021-11-02 ENCOUNTER — CLINICAL SUPPORT (OUTPATIENT)
Dept: REHABILITATION | Facility: HOSPITAL | Age: 21
End: 2021-11-02
Payer: COMMERCIAL

## 2021-11-02 DIAGNOSIS — M25.571 ACUTE RIGHT ANKLE PAIN: ICD-10-CM

## 2021-11-02 PROCEDURE — 97110 THERAPEUTIC EXERCISES: CPT | Mod: PO,CQ

## 2021-11-03 ENCOUNTER — OFFICE VISIT (OUTPATIENT)
Dept: INTERNAL MEDICINE | Facility: CLINIC | Age: 21
End: 2021-11-03
Payer: COMMERCIAL

## 2021-11-03 VITALS
DIASTOLIC BLOOD PRESSURE: 78 MMHG | BODY MASS INDEX: 29.35 KG/M2 | HEIGHT: 64 IN | SYSTOLIC BLOOD PRESSURE: 112 MMHG | WEIGHT: 171.94 LBS

## 2021-11-03 DIAGNOSIS — N76.0 BACTERIAL VAGINOSIS: ICD-10-CM

## 2021-11-03 DIAGNOSIS — Z86.39 HX OF NON ANEMIC VITAMIN B12 DEFICIENCY: ICD-10-CM

## 2021-11-03 DIAGNOSIS — F31.81 BIPOLAR II DISORDER: ICD-10-CM

## 2021-11-03 DIAGNOSIS — F32.89 OTHER DEPRESSION: ICD-10-CM

## 2021-11-03 DIAGNOSIS — F43.10 PTSD (POST-TRAUMATIC STRESS DISORDER): ICD-10-CM

## 2021-11-03 DIAGNOSIS — Z20.2 EXPOSURE TO STD: ICD-10-CM

## 2021-11-03 DIAGNOSIS — J45.990 EXERCISE-INDUCED ASTHMA: ICD-10-CM

## 2021-11-03 DIAGNOSIS — B96.89 BACTERIAL VAGINOSIS: ICD-10-CM

## 2021-11-03 DIAGNOSIS — F10.10 ALCOHOL ABUSE: ICD-10-CM

## 2021-11-03 DIAGNOSIS — Z00.00 PREVENTATIVE HEALTH CARE: Primary | ICD-10-CM

## 2021-11-03 PROBLEM — G95.9 MYELOPATHY: Status: RESOLVED | Noted: 2019-12-10 | Resolved: 2021-11-03

## 2021-11-03 PROBLEM — M25.571 ACUTE RIGHT ANKLE PAIN: Status: RESOLVED | Noted: 2021-10-26 | Resolved: 2021-11-03

## 2021-11-03 PROBLEM — R00.2 PALPITATIONS: Status: RESOLVED | Noted: 2021-06-17 | Resolved: 2021-11-03

## 2021-11-03 PROCEDURE — 99395 PREV VISIT EST AGE 18-39: CPT | Mod: S$GLB,,, | Performed by: STUDENT IN AN ORGANIZED HEALTH CARE EDUCATION/TRAINING PROGRAM

## 2021-11-03 PROCEDURE — 99395 PR PREVENTIVE VISIT,EST,18-39: ICD-10-PCS | Mod: S$GLB,,, | Performed by: STUDENT IN AN ORGANIZED HEALTH CARE EDUCATION/TRAINING PROGRAM

## 2021-11-03 PROCEDURE — 99999 PR PBB SHADOW E&M-EST. PATIENT-LVL III: ICD-10-PCS | Mod: PBBFAC,,, | Performed by: STUDENT IN AN ORGANIZED HEALTH CARE EDUCATION/TRAINING PROGRAM

## 2021-11-03 PROCEDURE — 3078F DIAST BP <80 MM HG: CPT | Mod: CPTII,S$GLB,, | Performed by: STUDENT IN AN ORGANIZED HEALTH CARE EDUCATION/TRAINING PROGRAM

## 2021-11-03 PROCEDURE — 3008F BODY MASS INDEX DOCD: CPT | Mod: CPTII,S$GLB,, | Performed by: STUDENT IN AN ORGANIZED HEALTH CARE EDUCATION/TRAINING PROGRAM

## 2021-11-03 PROCEDURE — 3008F PR BODY MASS INDEX (BMI) DOCUMENTED: ICD-10-PCS | Mod: CPTII,S$GLB,, | Performed by: STUDENT IN AN ORGANIZED HEALTH CARE EDUCATION/TRAINING PROGRAM

## 2021-11-03 PROCEDURE — 3078F PR MOST RECENT DIASTOLIC BLOOD PRESSURE < 80 MM HG: ICD-10-PCS | Mod: CPTII,S$GLB,, | Performed by: STUDENT IN AN ORGANIZED HEALTH CARE EDUCATION/TRAINING PROGRAM

## 2021-11-03 PROCEDURE — 1159F MED LIST DOCD IN RCRD: CPT | Mod: CPTII,S$GLB,, | Performed by: STUDENT IN AN ORGANIZED HEALTH CARE EDUCATION/TRAINING PROGRAM

## 2021-11-03 PROCEDURE — 3074F SYST BP LT 130 MM HG: CPT | Mod: CPTII,S$GLB,, | Performed by: STUDENT IN AN ORGANIZED HEALTH CARE EDUCATION/TRAINING PROGRAM

## 2021-11-03 PROCEDURE — 3074F PR MOST RECENT SYSTOLIC BLOOD PRESSURE < 130 MM HG: ICD-10-PCS | Mod: CPTII,S$GLB,, | Performed by: STUDENT IN AN ORGANIZED HEALTH CARE EDUCATION/TRAINING PROGRAM

## 2021-11-03 PROCEDURE — 1159F PR MEDICATION LIST DOCUMENTED IN MEDICAL RECORD: ICD-10-PCS | Mod: CPTII,S$GLB,, | Performed by: STUDENT IN AN ORGANIZED HEALTH CARE EDUCATION/TRAINING PROGRAM

## 2021-11-03 PROCEDURE — 99999 PR PBB SHADOW E&M-EST. PATIENT-LVL III: CPT | Mod: PBBFAC,,, | Performed by: STUDENT IN AN ORGANIZED HEALTH CARE EDUCATION/TRAINING PROGRAM

## 2021-11-03 RX ORDER — METRONIDAZOLE 500 MG/1
500 TABLET ORAL EVERY 12 HOURS
Qty: 14 TABLET | Refills: 0 | Status: SHIPPED | OUTPATIENT
Start: 2021-11-03 | End: 2021-12-13

## 2021-11-03 RX ORDER — VENLAFAXINE HYDROCHLORIDE 150 MG/1
150 CAPSULE, EXTENDED RELEASE ORAL DAILY
Qty: 30 CAPSULE | Refills: 11
Start: 2021-11-03 | End: 2022-11-03

## 2021-11-03 RX ORDER — QUETIAPINE FUMARATE 25 MG/1
25 TABLET, FILM COATED ORAL DAILY
Qty: 30 TABLET | Refills: 11
Start: 2021-11-03 | End: 2022-11-03

## 2021-11-04 ENCOUNTER — IMMUNIZATION (OUTPATIENT)
Dept: PHARMACY | Facility: CLINIC | Age: 21
End: 2021-11-04
Payer: COMMERCIAL

## 2021-11-04 ENCOUNTER — CLINICAL SUPPORT (OUTPATIENT)
Dept: REHABILITATION | Facility: HOSPITAL | Age: 21
End: 2021-11-04
Payer: COMMERCIAL

## 2021-11-04 DIAGNOSIS — M25.571 ACUTE RIGHT ANKLE PAIN: Primary | ICD-10-CM

## 2021-11-04 PROCEDURE — 97110 THERAPEUTIC EXERCISES: CPT | Mod: PO,CQ

## 2021-11-04 PROCEDURE — 97112 NEUROMUSCULAR REEDUCATION: CPT | Mod: PO,CQ

## 2021-11-05 ENCOUNTER — CLINICAL SUPPORT (OUTPATIENT)
Dept: REHABILITATION | Facility: OTHER | Age: 21
End: 2021-11-05
Payer: COMMERCIAL

## 2021-11-05 DIAGNOSIS — M25.571 ACUTE RIGHT ANKLE PAIN: Primary | ICD-10-CM

## 2021-11-05 PROCEDURE — 97110 THERAPEUTIC EXERCISES: CPT | Mod: PN

## 2021-11-05 PROCEDURE — 97112 NEUROMUSCULAR REEDUCATION: CPT | Mod: PN

## 2021-11-12 ENCOUNTER — HOSPITAL ENCOUNTER (EMERGENCY)
Facility: OTHER | Age: 21
Discharge: HOME | End: 2021-11-13
Attending: EMERGENCY MEDICINE
Payer: COMMERCIAL

## 2021-11-12 ENCOUNTER — CLINICAL SUPPORT (OUTPATIENT)
Dept: REHABILITATION | Facility: OTHER | Age: 21
End: 2021-11-12
Payer: COMMERCIAL

## 2021-11-12 VITALS
HEART RATE: 73 BPM | SYSTOLIC BLOOD PRESSURE: 111 MMHG | DIASTOLIC BLOOD PRESSURE: 62 MMHG | WEIGHT: 170 LBS | HEIGHT: 64 IN | OXYGEN SATURATION: 95 % | TEMPERATURE: 99 F | BODY MASS INDEX: 29.02 KG/M2 | RESPIRATION RATE: 18 BRPM

## 2021-11-12 DIAGNOSIS — M25.571 ACUTE RIGHT ANKLE PAIN: ICD-10-CM

## 2021-11-12 DIAGNOSIS — G44.319 ACUTE POST-TRAUMATIC HEADACHE, NOT INTRACTABLE: Primary | ICD-10-CM

## 2021-11-12 LAB
B-HCG UR QL: NEGATIVE
CTP QC/QA: YES

## 2021-11-12 PROCEDURE — 81025 URINE PREGNANCY TEST: CPT | Performed by: EMERGENCY MEDICINE

## 2021-11-12 PROCEDURE — 97140 MANUAL THERAPY 1/> REGIONS: CPT | Mod: PN,CQ

## 2021-11-12 PROCEDURE — 97110 THERAPEUTIC EXERCISES: CPT | Mod: PN,CQ

## 2021-11-12 PROCEDURE — 25000003 PHARM REV CODE 250: Performed by: EMERGENCY MEDICINE

## 2021-11-12 PROCEDURE — 87389 HIV-1 AG W/HIV-1&-2 AB AG IA: CPT | Performed by: EMERGENCY MEDICINE

## 2021-11-12 PROCEDURE — 99283 EMERGENCY DEPT VISIT LOW MDM: CPT | Mod: 25

## 2021-11-12 PROCEDURE — 86803 HEPATITIS C AB TEST: CPT | Performed by: EMERGENCY MEDICINE

## 2021-11-12 RX ORDER — DIPHENHYDRAMINE HCL 25 MG
25 CAPSULE ORAL
Status: COMPLETED | OUTPATIENT
Start: 2021-11-13 | End: 2021-11-12

## 2021-11-12 RX ORDER — NAPROXEN 500 MG/1
500 TABLET ORAL
Status: COMPLETED | OUTPATIENT
Start: 2021-11-12 | End: 2021-11-12

## 2021-11-12 RX ORDER — METHOCARBAMOL 750 MG/1
1500 TABLET, FILM COATED ORAL 3 TIMES DAILY PRN
Qty: 30 TABLET | Refills: 0 | Status: SHIPPED | OUTPATIENT
Start: 2021-11-12 | End: 2021-11-17

## 2021-11-12 RX ORDER — LIDOCAINE 50 MG/G
PATCH TOPICAL
Qty: 30 PATCH | Refills: 0 | Status: SHIPPED | OUTPATIENT
Start: 2021-11-12 | End: 2021-12-13

## 2021-11-12 RX ORDER — METHOCARBAMOL 750 MG/1
1500 TABLET, FILM COATED ORAL
Status: COMPLETED | OUTPATIENT
Start: 2021-11-12 | End: 2021-11-12

## 2021-11-12 RX ORDER — METOCLOPRAMIDE 10 MG/1
10 TABLET ORAL
Status: DISCONTINUED | OUTPATIENT
Start: 2021-11-13 | End: 2021-11-13 | Stop reason: HOSPADM

## 2021-11-12 RX ORDER — BUTALBITAL, ACETAMINOPHEN AND CAFFEINE 300; 40; 50 MG/1; MG/1; MG/1
1 CAPSULE ORAL EVERY 6 HOURS PRN
Qty: 30 CAPSULE | Refills: 0 | Status: SHIPPED | OUTPATIENT
Start: 2021-11-12 | End: 2021-12-13 | Stop reason: ALTCHOICE

## 2021-11-12 RX ORDER — NAPROXEN 500 MG/1
500 TABLET ORAL 2 TIMES DAILY PRN
Qty: 60 TABLET | Refills: 0 | Status: SHIPPED | OUTPATIENT
Start: 2021-11-12 | End: 2021-12-13

## 2021-11-12 RX ORDER — DICLOFENAC SODIUM 10 MG/G
2 GEL TOPICAL 3 TIMES DAILY PRN
Qty: 100 G | Refills: 0 | Status: SHIPPED | OUTPATIENT
Start: 2021-11-12

## 2021-11-12 RX ORDER — BUTALBITAL, ACETAMINOPHEN AND CAFFEINE 50; 325; 40 MG/1; MG/1; MG/1
1 TABLET ORAL
Status: COMPLETED | OUTPATIENT
Start: 2021-11-12 | End: 2021-11-12

## 2021-11-12 RX ADMIN — METHOCARBAMOL TABLETS 1500 MG: 750 TABLET, COATED ORAL at 11:11

## 2021-11-12 RX ADMIN — BUTALBITAL, ACETAMINOPHEN, AND CAFFEINE 1 TABLET: 50; 325; 40 TABLET ORAL at 11:11

## 2021-11-12 RX ADMIN — NAPROXEN 500 MG: 500 TABLET ORAL at 11:11

## 2021-11-12 RX ADMIN — DIPHENHYDRAMINE HYDROCHLORIDE 25 MG: 25 CAPSULE ORAL at 11:11

## 2021-11-13 LAB
HCV AB SERPL QL IA: NEGATIVE
HIV 1+2 AB+HIV1 P24 AG SERPL QL IA: NEGATIVE

## 2021-11-13 PROCEDURE — 25000003 PHARM REV CODE 250: Performed by: EMERGENCY MEDICINE

## 2021-11-18 ENCOUNTER — CLINICAL SUPPORT (OUTPATIENT)
Dept: REHABILITATION | Facility: OTHER | Age: 21
End: 2021-11-18
Payer: COMMERCIAL

## 2021-11-18 DIAGNOSIS — M25.571 ACUTE RIGHT ANKLE PAIN: ICD-10-CM

## 2021-11-18 PROCEDURE — 97112 NEUROMUSCULAR REEDUCATION: CPT | Mod: PN,CQ

## 2021-11-18 PROCEDURE — 97110 THERAPEUTIC EXERCISES: CPT | Mod: PN,CQ

## 2021-11-30 ENCOUNTER — CLINICAL SUPPORT (OUTPATIENT)
Dept: REHABILITATION | Facility: OTHER | Age: 21
End: 2021-11-30
Payer: COMMERCIAL

## 2021-11-30 DIAGNOSIS — M25.571 ACUTE RIGHT ANKLE PAIN: ICD-10-CM

## 2021-11-30 PROCEDURE — 97112 NEUROMUSCULAR REEDUCATION: CPT | Mod: PN,CQ

## 2021-11-30 PROCEDURE — 97110 THERAPEUTIC EXERCISES: CPT | Mod: PN,CQ

## 2021-12-07 ENCOUNTER — CLINICAL SUPPORT (OUTPATIENT)
Dept: REHABILITATION | Facility: OTHER | Age: 21
End: 2021-12-07
Payer: COMMERCIAL

## 2021-12-07 DIAGNOSIS — M25.571 ACUTE RIGHT ANKLE PAIN: ICD-10-CM

## 2021-12-07 PROCEDURE — 97110 THERAPEUTIC EXERCISES: CPT | Mod: PN

## 2021-12-07 PROCEDURE — 97112 NEUROMUSCULAR REEDUCATION: CPT | Mod: PN

## 2021-12-10 ENCOUNTER — PATIENT MESSAGE (OUTPATIENT)
Dept: REHABILITATION | Facility: OTHER | Age: 21
End: 2021-12-10
Payer: COMMERCIAL

## 2021-12-10 ENCOUNTER — DOCUMENTATION ONLY (OUTPATIENT)
Dept: REHABILITATION | Facility: OTHER | Age: 21
End: 2021-12-10
Payer: COMMERCIAL

## 2021-12-12 ENCOUNTER — PATIENT OUTREACH (OUTPATIENT)
Dept: ADMINISTRATIVE | Facility: OTHER | Age: 21
End: 2021-12-12
Payer: COMMERCIAL

## 2021-12-13 ENCOUNTER — CLINICAL SUPPORT (OUTPATIENT)
Dept: OBSTETRICS AND GYNECOLOGY | Facility: CLINIC | Age: 21
End: 2021-12-13
Payer: COMMERCIAL

## 2021-12-13 ENCOUNTER — OFFICE VISIT (OUTPATIENT)
Dept: OBSTETRICS AND GYNECOLOGY | Facility: CLINIC | Age: 21
End: 2021-12-13
Payer: COMMERCIAL

## 2021-12-13 VITALS
WEIGHT: 165.38 LBS | SYSTOLIC BLOOD PRESSURE: 131 MMHG | DIASTOLIC BLOOD PRESSURE: 80 MMHG | HEART RATE: 111 BPM | HEIGHT: 64 IN | BODY MASS INDEX: 28.24 KG/M2

## 2021-12-13 DIAGNOSIS — N76.0 VAGINITIS AND VULVOVAGINITIS: ICD-10-CM

## 2021-12-13 DIAGNOSIS — Z11.3 SCREEN FOR STD (SEXUALLY TRANSMITTED DISEASE): ICD-10-CM

## 2021-12-13 DIAGNOSIS — Z30.431 ENCOUNTER FOR ROUTINE CHECKING OF INTRAUTERINE CONTRACEPTIVE DEVICE (IUD): ICD-10-CM

## 2021-12-13 DIAGNOSIS — Z23 NEED FOR HPV VACCINATION: Primary | ICD-10-CM

## 2021-12-13 DIAGNOSIS — Z01.419 WELL WOMAN EXAM WITH ROUTINE GYNECOLOGICAL EXAM: Primary | ICD-10-CM

## 2021-12-13 PROCEDURE — 90471 IMMUNIZATION ADMIN: CPT | Mod: S$GLB,,, | Performed by: OBSTETRICS & GYNECOLOGY

## 2021-12-13 PROCEDURE — 90651 9VHPV VACCINE 2/3 DOSE IM: CPT | Mod: S$GLB,,, | Performed by: OBSTETRICS & GYNECOLOGY

## 2021-12-13 PROCEDURE — 99999 PR PBB SHADOW E&M-EST. PATIENT-LVL III: CPT | Mod: PBBFAC,,, | Performed by: OBSTETRICS & GYNECOLOGY

## 2021-12-13 PROCEDURE — 99999 PR PBB SHADOW E&M-EST. PATIENT-LVL III: ICD-10-PCS | Mod: PBBFAC,,, | Performed by: OBSTETRICS & GYNECOLOGY

## 2021-12-13 PROCEDURE — 99395 PREV VISIT EST AGE 18-39: CPT | Mod: 25,S$GLB,, | Performed by: OBSTETRICS & GYNECOLOGY

## 2021-12-13 PROCEDURE — 99395 PR PREVENTIVE VISIT,EST,18-39: ICD-10-PCS | Mod: 25,S$GLB,, | Performed by: OBSTETRICS & GYNECOLOGY

## 2021-12-13 PROCEDURE — 87591 N.GONORRHOEAE DNA AMP PROB: CPT | Mod: 59 | Performed by: OBSTETRICS & GYNECOLOGY

## 2021-12-13 PROCEDURE — 87481 CANDIDA DNA AMP PROBE: CPT | Mod: 59 | Performed by: OBSTETRICS & GYNECOLOGY

## 2021-12-13 PROCEDURE — 90471 HPV VACCINE 9-VALENT 3 DOSE IM: ICD-10-PCS | Mod: S$GLB,,, | Performed by: OBSTETRICS & GYNECOLOGY

## 2021-12-13 PROCEDURE — 87491 CHLMYD TRACH DNA AMP PROBE: CPT | Performed by: OBSTETRICS & GYNECOLOGY

## 2021-12-13 PROCEDURE — 90651 HPV VACCINE 9-VALENT 3 DOSE IM: ICD-10-PCS | Mod: S$GLB,,, | Performed by: OBSTETRICS & GYNECOLOGY

## 2021-12-13 RX ORDER — METRONIDAZOLE 7.5 MG/G
1 GEL VAGINAL DAILY
Qty: 70 G | Refills: 0 | Status: SHIPPED | OUTPATIENT
Start: 2021-12-13 | End: 2021-12-18

## 2021-12-16 ENCOUNTER — DOCUMENTATION ONLY (OUTPATIENT)
Dept: REHABILITATION | Facility: OTHER | Age: 21
End: 2021-12-16
Payer: COMMERCIAL

## 2021-12-16 ENCOUNTER — PATIENT MESSAGE (OUTPATIENT)
Dept: OBSTETRICS AND GYNECOLOGY | Facility: CLINIC | Age: 21
End: 2021-12-16
Payer: COMMERCIAL

## 2021-12-16 DIAGNOSIS — B37.31 CANDIDA VAGINITIS: Primary | ICD-10-CM

## 2021-12-16 LAB
BACTERIAL VAGINOSIS DNA: POSITIVE
CANDIDA GLABRATA DNA: NEGATIVE
CANDIDA KRUSEI DNA: NEGATIVE
CANDIDA RRNA VAG QL PROBE: POSITIVE
T VAGINALIS RRNA GENITAL QL PROBE: NEGATIVE

## 2021-12-16 RX ORDER — TERCONAZOLE 4 MG/G
1 CREAM VAGINAL NIGHTLY
Qty: 45 G | Refills: 0 | Status: SHIPPED | OUTPATIENT
Start: 2021-12-16 | End: 2021-12-23

## 2021-12-18 LAB
C TRACH DNA SPEC QL NAA+PROBE: NOT DETECTED
N GONORRHOEA DNA SPEC QL NAA+PROBE: NOT DETECTED

## 2021-12-21 ENCOUNTER — PATIENT MESSAGE (OUTPATIENT)
Dept: ENDOSCOPY | Facility: HOSPITAL | Age: 21
End: 2021-12-21
Payer: COMMERCIAL

## 2022-01-24 ENCOUNTER — CLINICAL SUPPORT (OUTPATIENT)
Dept: OBSTETRICS AND GYNECOLOGY | Facility: CLINIC | Age: 22
End: 2022-01-24
Payer: COMMERCIAL

## 2022-01-24 DIAGNOSIS — Z23 NEED FOR HPV VACCINATION: Primary | ICD-10-CM

## 2022-01-24 PROCEDURE — 90471 IMMUNIZATION ADMIN: CPT | Mod: S$GLB,,, | Performed by: OBSTETRICS & GYNECOLOGY

## 2022-01-24 PROCEDURE — 90471 HPV VACCINE 9-VALENT 3 DOSE IM: ICD-10-PCS | Mod: S$GLB,,, | Performed by: OBSTETRICS & GYNECOLOGY

## 2022-01-24 PROCEDURE — 90651 9VHPV VACCINE 2/3 DOSE IM: CPT | Mod: S$GLB,,, | Performed by: OBSTETRICS & GYNECOLOGY

## 2022-01-24 PROCEDURE — 90651 HPV VACCINE 9-VALENT 3 DOSE IM: ICD-10-PCS | Mod: S$GLB,,, | Performed by: OBSTETRICS & GYNECOLOGY

## 2022-01-24 NOTE — PROGRESS NOTES
Here for Gardasil # 2 injection, without complaint at this time. Reports no pain before or after injection. Advised to wait in lobby 15 minutes after injection and report any adverse reactions. Return to clinic as directed for next injection.     Site: [facility]

## 2024-02-27 ENCOUNTER — PATIENT OUTREACH (OUTPATIENT)
Dept: ADMINISTRATIVE | Facility: HOSPITAL | Age: 24
End: 2024-02-27
Payer: COMMERCIAL

## 2024-02-27 NOTE — PROGRESS NOTES
Population Health Chart Review & Patient Outreach Details    Outreach Performed: YES Telephone Not Successful    Additional Pop Health Notes:    [place]oved out of state       Updates Requested / Reviewed:      Updated Care Coordination Note, Care Everywhere, Removed  or Duplicate Orders, and Immunizations Reconciliation Completed or Queried: [place]         Health Maintenance Topics Overdue:    Health Maintenance Due   Topic Date Due    Pap Smear  Never done    HPV Vaccines (3 - 3-dose series) 2022    Chlamydia Screening  2022    Influenza Vaccine (1) 2023    COVID-19 Vaccine (3 - 2023-24 season) 2023         Health Maintenance Topic(s) Outreach Outcomes & Actions Taken:    Primary Care Appt - Outreach Outcomes & Actions Taken  : Pt Established with External Provider, Updated Care Team, & Informed Pt to Notify Payor if Applicable